# Patient Record
Sex: MALE | Race: WHITE | NOT HISPANIC OR LATINO | Employment: OTHER | URBAN - METROPOLITAN AREA
[De-identification: names, ages, dates, MRNs, and addresses within clinical notes are randomized per-mention and may not be internally consistent; named-entity substitution may affect disease eponyms.]

---

## 2017-02-22 RX ORDER — DIAZEPAM 5 MG/1
TABLET ORAL
Refills: 0 | OUTPATIENT
Start: 2017-02-22

## 2017-03-01 ENCOUNTER — OFFICE VISIT (OUTPATIENT)
Dept: MEDICAL GROUP | Facility: LAB | Age: 72
End: 2017-03-01
Payer: MEDICARE

## 2017-03-01 VITALS
DIASTOLIC BLOOD PRESSURE: 78 MMHG | BODY MASS INDEX: 31.07 KG/M2 | RESPIRATION RATE: 16 BRPM | TEMPERATURE: 99.2 F | WEIGHT: 197.97 LBS | HEART RATE: 81 BPM | SYSTOLIC BLOOD PRESSURE: 122 MMHG | HEIGHT: 67 IN | OXYGEN SATURATION: 96 %

## 2017-03-01 DIAGNOSIS — F41.9 ANXIETY: ICD-10-CM

## 2017-03-01 PROCEDURE — 1036F TOBACCO NON-USER: CPT | Performed by: NURSE PRACTITIONER

## 2017-03-01 PROCEDURE — G8419 CALC BMI OUT NRM PARAM NOF/U: HCPCS | Performed by: NURSE PRACTITIONER

## 2017-03-01 PROCEDURE — G8484 FLU IMMUNIZE NO ADMIN: HCPCS | Performed by: NURSE PRACTITIONER

## 2017-03-01 PROCEDURE — G8598 ASA/ANTIPLAT THER USED: HCPCS | Performed by: NURSE PRACTITIONER

## 2017-03-01 PROCEDURE — G8432 DEP SCR NOT DOC, RNG: HCPCS | Performed by: NURSE PRACTITIONER

## 2017-03-01 PROCEDURE — 99213 OFFICE O/P EST LOW 20 MIN: CPT | Performed by: NURSE PRACTITIONER

## 2017-03-01 PROCEDURE — 4040F PNEUMOC VAC/ADMIN/RCVD: CPT | Mod: 8P | Performed by: NURSE PRACTITIONER

## 2017-03-01 PROCEDURE — 1101F PT FALLS ASSESS-DOCD LE1/YR: CPT | Performed by: NURSE PRACTITIONER

## 2017-03-01 PROCEDURE — 3017F COLORECTAL CA SCREEN DOC REV: CPT | Performed by: NURSE PRACTITIONER

## 2017-03-01 RX ORDER — DIAZEPAM 5 MG/1
5 TABLET ORAL EVERY 8 HOURS PRN
Qty: 30 TAB | Refills: 1 | Status: SHIPPED
Start: 2017-03-01 | End: 2017-07-10 | Stop reason: SDUPTHER

## 2017-03-01 ASSESSMENT — PATIENT HEALTH QUESTIONNAIRE - PHQ9: CLINICAL INTERPRETATION OF PHQ2 SCORE: 0

## 2017-03-01 NOTE — MR AVS SNAPSHOT
"        Amari Esparza   3/1/2017 2:40 PM   Office Visit   MRN: [id number]    Department:  [facility] [place]   Dept Phone:  [phone]    Description:  Male : 1945   Provider:  HAROON Flowers           Reason for Visit     Medication Refill           Allergies as of 3/1/2017     Allergen Noted Reactions    Sulfa Drugs 2009   Swelling      You were diagnosed with     Anxiety   [258693]         Vital Signs     Blood Pressure Pulse Temperature Respirations Height Weight    122/78 mmHg 81 37.3 °C (99.2 °F) 16 1.702 m (5' 7\") 89.8 kg (197 lb 15.6 oz)    Body Mass Index Oxygen Saturation Smoking Status             31.00 kg/m2 96% Never Smoker          Basic Information     Date Of Birth Sex Race Ethnicity Preferred Language    1945 Male White Non- English      Your appointments     2017  8:00 AM   NEW TO YOU with Beatriz Damian M.D.   [facility] (--)    [address] [id number] [phone]              Problem List              ICD-10-CM Priority Class Noted - Resolved    Sinus bradycardia R00.1   Unknown - Present    ASTHMA    Unknown - Present    ED (erectile dysfunction) N52.9   2010 - Present    Anxiety F41.9   2010 - Present    History of ETOH abuse Z87.898   2012 - Present    Chronic gout of multiple sites M1A.09X0   2012 - Present    Sinusitis, chronic J32.9   2012 - Present    PTSD (post-traumatic stress disorder) F43.10   2012 - Present    Diverticulosis of colon K57.30   2012 - Present    Benign essential hypertension (Chronic) I10 Medium  2011 - Present    Carotid artery plaque (Chronic) I65.29 Low  2011 - Present    History of echocardiogram (Chronic) Z92.89   2010 - Present    HLD (hyperlipidemia) (Chronic) E78.5 Medium  2011 - Present    History of myocardial perfusion scan (Chronic) Z92.89   2010 - Present    Presence of permanent cardiac " pacemaker (Chronic) Z95.0 High  11/8/2011 - Present    Third degree AV block (CMS-HCC) (Chronic) I44.2 High  11/8/2011 - Present    GERD (gastroesophageal reflux disease) K21.9   3/25/2014 - Present    Arthritis M19.90   3/25/2014 - Present    Vitamin D deficiency disease E55.9   9/9/2015 - Present    Dry eyes H04.123   9/9/2015 - Present    Chronic pansinusitis J32.4   9/9/2015 - Present      Health Maintenance        Date Due Completion Dates    IMM PNEUMOCOCCAL 65+ (ADULT) LOW/MEDIUM RISK SERIES (1 of 2 - PCV13) 10/23/2011 ---    IMM INFLUENZA (1) 9/1/2016 ---    IMM DTaP/Tdap/Td Vaccine (2 - Td) 1/11/2022 1/11/2012    COLONOSCOPY 3/6/2025 3/6/2015 (Done), 10/22/2007 (Prv Comp)    [facility]    [facility] on 10/22/2007: Previously completed            Current Immunizations     SHINGLES VACCINE 1/11/2012    Tdap Vaccine 1/11/2012      Below and/or attached are the medications your provider expects you to take. Review all of your home medications and newly ordered medications with your provider and/or pharmacist. Follow medication instructions as directed by your provider and/or pharmacist. Please keep your medication list with you and share with your provider. Update the information when medications are discontinued, doses are changed, or new medications (including over-the-counter products) are added; and carry medication information at all times in the event of emergency situations     Allergies:  SULFA DRUGS - Swelling               Medications  Valid as of: March 01, 2017 -  4:35 PM    Generic Name Brand Name Tablet Size Instructions for use    Albuterol Sulfate (Aero Soln) albuterol 108 (90 BASE) MCG/ACT Inhale 2 Puffs by mouth every 6 hours as needed for Shortness of Breath.        Aspirin (Tab) aspirin 81 MG Take 81 mg by mouth every day. 2 times daily         Cholecalciferol (Cap) VITAMIN D 400 UNIT Take 3 Caps by mouth every day.        DiazePAM (Tab) VALIUM 5 MG Take 1 Tab by mouth  every 8 hours as needed for Anxiety.        HydroCHLOROthiazide (Tab) HYDRODIURIL 25 MG Take 25 mg by mouth as needed. FOR FLUID RETENTION.         Hypromellose (Solution) TEARS 0.4 % Place 1 Drop in both eyes 3 times a day as needed for Dry Eyes.        Indomethacin (Cap CR) INDOCIN SR 75 MG Take 1 Cap by mouth 2 times daily with meals as needed (gout symptoms).        Ketotifen Fumarate (Solution) ZADITOR 0.025 % Place 1 Drop in both eyes 2 times a day.        Losartan Potassium (Tab) COZAAR 50 MG Take 50 mg by mouth every day.        Methocarbamol (Tab) ROBAXIN 500 MG Take 2 Tabs by mouth 3 times a day as needed.        Naltrexone HCl (Tab) DEPADE 50 MG Take 50 mg by mouth every day.        Pantoprazole Sodium (Tablet Delayed Response) PROTONIX 40 MG Take 40 mg by mouth every day.        Sildenafil Citrate (Tab) VIAGRA 100 MG Take 1 Tab by mouth as needed for Erectile Dysfunction.        .                 Medicines prescribed today were sent to:     [facility]/PHARMACY #[id number] - [facility] [address]    [address] [address]    Phone: [phone] Fax: [phone]    Open 24 Hours?: No      Medication refill instructions:       If your prescription bottle indicates you have medication refills left, it is not necessary to call your provider’s office. Please contact your pharmacy and they will refill your medication.    If your prescription bottle indicates you do not have any refills left, you may request refills at any time through one of the following ways: The online [employer] system (except Urgent Care), by calling your provider’s office, or by asking your pharmacy to contact your provider’s office with a refill request. Medication refills are processed only during regular business hours and may not be available until the next business day. Your provider may request additional information or to have a follow-up visit with you prior to refilling your medication.   *Please Note: Medication refills are  assigned a new Rx number when refilled electronically. Your pharmacy may indicate that no refills were authorized even though a new prescription for the same medication is available at the pharmacy. Please request the medicine by name with the pharmacy before contacting your provider for a refill.           MyChart Status: Patient Declined

## 2017-03-02 NOTE — PROGRESS NOTES
"Chief Complaint   Patient presents with   • Medication Refill       HPI  Amari is a 71-year-old established male here with request to refill his diazepam. Typically #20-30 diazepam 5 mg tablets last him for 4-6 months. He takes diazepam only as needed for acute anxiety. He states that he's used diazepam as needed for anxiety from his 30 years. Denies any negative side effects of diazepam. He does not drink or drive a car within 4-6 hours of taking diazepam. He does not experience anxiety on a daily basis.      Past medical, surgical, family, and social history is reviewed and updated in Epic chart by me today.   Medications and allergies reviewed and updated in Epic chart by me today.     ROS:   As documented in history of present illness above    Exam:  Blood pressure 122/78, pulse 81, temperature 37.3 °C (99.2 °F), resp. rate 16, height 1.702 m (5' 7\"), weight 89.8 kg (197 lb 15.6 oz), SpO2 96 %.  Gen. appears healthy in no distress   Skin appropriate for sex and age   HEENT unremarkable   Neck no adenopathy, no nodules or masses palpable   Lungs clear   Heart regular   Extremities no edema   Neuro gait and station normal   Psych appropriate, calm and interactive      A/P:  1. Anxiety  -Stable and chronic issue. Continue same. Reviewed  profile which is appropriate, showing last pill was several months ago. Urine drug screen not collected today as patient does not take this medication on a daily basis.  - diazepam (VALIUM) 5 MG Tab; Take 1 Tab by mouth every 8 hours as needed for Anxiety.  Dispense: 30 Tab; Refill: 1      "

## 2017-04-13 ENCOUNTER — OFFICE VISIT (OUTPATIENT)
Dept: MEDICAL GROUP | Facility: LAB | Age: 72
End: 2017-04-13
Payer: MEDICARE

## 2017-04-13 VITALS
HEART RATE: 84 BPM | HEIGHT: 67 IN | OXYGEN SATURATION: 96 % | SYSTOLIC BLOOD PRESSURE: 136 MMHG | RESPIRATION RATE: 12 BRPM | BODY MASS INDEX: 30.76 KG/M2 | TEMPERATURE: 98.6 F | WEIGHT: 196 LBS | DIASTOLIC BLOOD PRESSURE: 82 MMHG

## 2017-04-13 DIAGNOSIS — F41.9 ANXIETY: ICD-10-CM

## 2017-04-13 DIAGNOSIS — F43.10 PTSD (POST-TRAUMATIC STRESS DISORDER): ICD-10-CM

## 2017-04-13 DIAGNOSIS — I10 BENIGN ESSENTIAL HYPERTENSION: Chronic | ICD-10-CM

## 2017-04-13 DIAGNOSIS — Z95.0 PRESENCE OF PERMANENT CARDIAC PACEMAKER: Chronic | ICD-10-CM

## 2017-04-13 DIAGNOSIS — J32.9 CHRONIC SINUSITIS, UNSPECIFIED LOCATION: ICD-10-CM

## 2017-04-13 DIAGNOSIS — R73.03 PREDIABETES: ICD-10-CM

## 2017-04-13 PROCEDURE — G8598 ASA/ANTIPLAT THER USED: HCPCS | Performed by: FAMILY MEDICINE

## 2017-04-13 PROCEDURE — 1101F PT FALLS ASSESS-DOCD LE1/YR: CPT | Performed by: FAMILY MEDICINE

## 2017-04-13 PROCEDURE — 4040F PNEUMOC VAC/ADMIN/RCVD: CPT | Mod: 8P | Performed by: FAMILY MEDICINE

## 2017-04-13 PROCEDURE — 99214 OFFICE O/P EST MOD 30 MIN: CPT | Performed by: FAMILY MEDICINE

## 2017-04-13 PROCEDURE — G8419 CALC BMI OUT NRM PARAM NOF/U: HCPCS | Performed by: FAMILY MEDICINE

## 2017-04-13 PROCEDURE — G8432 DEP SCR NOT DOC, RNG: HCPCS | Performed by: FAMILY MEDICINE

## 2017-04-13 PROCEDURE — 1036F TOBACCO NON-USER: CPT | Performed by: FAMILY MEDICINE

## 2017-04-13 RX ORDER — CLINDAMYCIN HYDROCHLORIDE 150 MG/1
300 CAPSULE ORAL 3 TIMES DAILY
COMMUNITY

## 2017-04-13 ASSESSMENT — ENCOUNTER SYMPTOMS
DOUBLE VISION: 0
NAUSEA: 0
INSOMNIA: 1
HEARTBURN: 0
DIZZINESS: 0
FEVER: 0
VOMITING: 0
SHORTNESS OF BREATH: 0

## 2017-04-13 NOTE — PROGRESS NOTES
"Subjective:      Amari Esparza is a 71 y.o. male who presents with Establish Care    Chief Complaint   Patient presents with   • Establish Care             HPI     Patient is here to establish. [facility], psychologist, primary care physician, psychiatrist, ENT.    Has a lump on his back and was told that it is fat. Had US to confirm this. [facility]. Is sometimes painful. He would like my opinion about this today.    Has bloodwork to go over today     Patient has a history of PTSD, anxiety. 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 States that both his [facility] and [facility] recommended that he have a service dog. He tells me today that the [facility] will not write a letter in support of this. He states that they are meeting next Wednesday to see if they can change their policy. He would like to know if I would write a letter of support for him if needed. Patient has had a dog in the past, in the 1980s. He states that he is able to sleep through the night and his anxiety is improved when he has a canine . When he does not have a canine  he sleeps 2 hours at a time. He also has more anxiety when he does not have a dog around. His current girlfriend has a dog and sometimes he will hang out with her dog and he does much better. He states that his PTSD mostly affects his sleep more than anything. He also states that he enjoys hiking however he has severe anxiety with hiking. He states that when he goes hiking he will \"freak out\". He reports that when he has a dog with him he is able to do this without being on high alert.  He currently is living somewhere where he cannot have a pet.    Patient has a pacemaker. He states that this is his second pacemaker.     Patient has chronic sinusitis. He will take clindamycin as needed      History of alcohol abuse. Was taking naltrexone and is off this " currently. Has not had etoh x 1 week. Was in rehab for a month in 2010 in [place] and this was helpful. [facility]    New patient health questionnaire reviewed and scanned into media    Patient Active Problem List    Diagnosis Date Noted   • Presence of permanent cardiac pacemaker 11/08/2011     Priority: High   • Third degree AV block (CMS-HCC) 11/08/2011     Priority: High   • Benign essential hypertension  BP is good. A little high today  11/08/2011     Priority: Medium   • HLD (hyperlipidemia)  Not on statin. Was on medication in the past  11/08/2011     Priority: Medium   • Carotid artery plaque 11/08/2011     Priority: Low   • Vitamin D deficiency disease 09/09/2015   • Dry eyes 09/09/2015   • Chronic pansinusitis 09/09/2015   • GERD (gastroesophageal reflux disease) 03/25/2014   • Arthritis 03/25/2014   • History of ETOH abuse 01/11/2012   • Chronic gout of multiple sites 01/11/2012   • Sinusitis, chronic 01/11/2012   • PTSD (post-traumatic stress disorder) 01/11/2012   • Diverticulosis of colon 01/11/2012   • ED (erectile dysfunction) 12/01/2010   • Anxiety  Taking valium maybe one every other day. Will take it during the day or at night. [facility] but [facility] not [facility]  12/01/2010   • History of echocardiogram 11/05/2010   • History of myocardial perfusion scan 11/05/2010   • Sinus bradycardia    • ASTHMA        Family History   Problem Relation Age of Onset   • Heart Disease Father    • Diabetes Father    • Diabetes Brother    • Cancer Sister      breast        Social History     Social History   • Marital Status: Single     Spouse Name: N/A   • Number of Children: N/A   • Years of Education: N/A     Occupational History   • Not on file.     Social History Main Topics   • Smoking status: Never Smoker    • Smokeless tobacco: Never Used   • Alcohol Use: No      Comment: quit    • Drug Use: No   • Sexual Activity:     Partners: Female     Other Topics  Concern   • Not on file     Social History Narrative         Current outpatient prescriptions:   •  clindamycin (CLEOCIN) 150 MG Cap, Take 300 mg by mouth 3 times a day., Disp: , Rfl:   •  diazepam (VALIUM) 5 MG Tab, Take 1 Tab by mouth every 8 hours as needed for Anxiety., Disp: 30 Tab, Rfl: 1  •  sildenafil citrate (VIAGRA) 100 MG tablet, Take 1 Tab by mouth as needed for Erectile Dysfunction., Disp: 9 Tab, Rfl: 11  •  indomethacin SR (INDOCIN SR) 75 MG Cap CR, Take 1 Cap by mouth 2 times daily with meals as needed (gout symptoms)., Disp: 60 Cap, Rfl: 1  •  cholecalciferol (VITAMIN D) 400 UNIT Cap, Take 3 Caps by mouth every day., Disp: 30 Cap, Rfl: 0  •  artificial tear (TEARS) 0.4 % ophthalmic solution, Place 1 Drop in both eyes 3 times a day as needed for Dry Eyes., Disp: 1 Bottle, Rfl: 5  •  ketotifen (ZADITOR) 0.025 % ophthalmic solution, Place 1 Drop in both eyes 2 times a day., Disp: 10 mL, Rfl: 3  •  methocarbamol (ROBAXIN) 500 MG Tab, Take 2 Tabs by mouth 3 times a day as needed., Disp: 90 Tab, Rfl: 2  •  hydrochlorothiazide (HYDRODIURIL) 25 MG TABS, Take 25 mg by mouth as needed. FOR FLUID RETENTION. , Disp: , Rfl:   •  losartan (COZAAR) 50 MG TABS, Take 50 mg by mouth every day., Disp: , Rfl:   •  albuterol (VENTOLIN OR PROVENTIL) 108 (90 BASE) MCG/ACT AERS, Inhale 2 Puffs by mouth every 6 hours as needed for Shortness of Breath., Disp: 2 Inhaler, Rfl: 11  •  ASPIRIN 81 MG tablet, Take 81 mg by mouth every day. 2 times daily , Disp: , Rfl:   •  pantoprazole (PROTONIX) 40 MG TBEC, Take 40 mg by mouth every day., Disp: , Rfl:         Review of Systems   Constitutional: Negative for fever.   HENT:        Congestion. Getting better. Chronic sinus problems    Eyes: Negative for double vision.   Respiratory: Negative for shortness of breath.    Cardiovascular: Negative for chest pain.   Gastrointestinal: Negative for heartburn, nausea and vomiting.        No change in stool   [facility]  "  Genitourinary: Negative for frequency.   Neurological: Negative for dizziness.   Endo/Heme/Allergies: Positive for environmental allergies (will see allergist this month ).   Psychiatric/Behavioral: The patient has insomnia (ambien not helping ).           Objective:     /82 mmHg  Pulse 84  Temp(Src) 37 °C (98.6 °F)  Resp 12  Ht 1.702 m (5' 7.01\")  Wt 88.905 kg (196 lb)  BMI 30.69 kg/m2  SpO2 96%     Physical Exam   Constitutional: He appears well-developed and well-nourished. He is active and cooperative.  Non-toxic appearance. He does not have a sickly appearance.   HENT:   Head: Normocephalic and atraumatic.   Eyes: Conjunctivae and EOM are normal.   Cardiovascular: Normal rate, regular rhythm and normal heart sounds.    Pulmonary/Chest: Effort normal and breath sounds normal. No tachypnea. No respiratory distress. He has no decreased breath sounds. He has no wheezes. He has no rhonchi. He has no rales.   Abdominal: Soft. There is no tenderness. There is no rigidity, no rebound and no guarding.   Neurological: He is alert. He displays no tremor. He displays no seizure activity. Coordination and gait normal.   Skin: Skin is warm and dry. He is not diaphoretic.   Lipoma on the mid right back by scapula    Psychiatric: He has a normal mood and affect. His speech is normal and behavior is normal.     [facility]. His CMP is essentially within normal limits. His total cholesterol 159, triglyceride 42, HDL 54 and LDL 98. His A1c is 5.9. These labs were done on January 18, 2017          Assessment/Plan:       1. Benign essential hypertension  Controlled. Continue medication    2. Chronic sinusitis, unspecified location  Patient is on antibiotics. [facility] is [facility]    3. PTSD (post-traumatic stress disorder)  Patient is interested in having a service dog. I agree with this. He is living somewhere where he cannot have pets. He states that he will try to get a letter of support " from the [facility] however if he has any trouble he will let me know. He's had a dog in the past and this greatly improved his anxiety and his sleep    4. Anxiety  Continue Valium as needed. He is to continue with his psychiatry and [facility]    5. Presence of permanent cardiac pacemaker  [facility]    Prediabetes. Recommend patient watch his carbohydrate intake.     He does want a second opinion about his lipoma excision. I am in agreement to have this excised.    Patient to follow up annually, sooner when necessary

## 2017-04-13 NOTE — MR AVS SNAPSHOT
"        Amari Esparza   2017 8:00 AM   Office Visit   MRN: [id number]    Department:  [facility]   Dept Phone:  [phone]    Description:  Male : 1945   Provider:  Beatriz Damian M.D.           Reason for Visit     Establish Care           Allergies as of 2017     Allergen Noted Reactions    Sulfa Drugs 2009   Swelling      You were diagnosed with     Benign essential hypertension   [003773]       Chronic sinusitis, unspecified location   [4319653]       PTSD (post-traumatic stress disorder)   [251590]       Anxiety   [551764]       Presence of permanent cardiac pacemaker   [775391]       Prediabetes   [126323]         Vital Signs     Blood Pressure Pulse Temperature Respirations Height Weight    136/82 mmHg 84 37 °C (98.6 °F) 12 1.702 m (5' 7.01\") 88.905 kg (196 lb)    Body Mass Index Oxygen Saturation Smoking Status             30.69 kg/m2 96% Never Smoker          Basic Information     Date Of Birth Sex Race Ethnicity Preferred Language    1945 Male White Non- English      Problem List              ICD-10-CM Priority Class Noted - Resolved    Sinus bradycardia R00.1   Unknown - Present    ASTHMA    Unknown - Present    ED (erectile dysfunction) N52.9   2010 - Present    Anxiety F41.9   2010 - Present    History of ETOH abuse Z87.898   2012 - Present    Chronic gout of multiple sites M1A.09X0   2012 - Present    Sinusitis, chronic J32.9   2012 - Present    PTSD (post-traumatic stress disorder) F43.10   2012 - Present    Diverticulosis of colon K57.30   2012 - Present    Benign essential hypertension (Chronic) I10 Medium  2011 - Present    Carotid artery plaque (Chronic) I65.29 Low  2011 - Present    History of echocardiogram (Chronic) Z92.89   2010 - Present    HLD (hyperlipidemia) (Chronic) E78.5 Medium  2011 - Present    History of myocardial perfusion scan (Chronic) Z92.89   2010 - Present   " Presence of permanent cardiac pacemaker (Chronic) Z95.0 High  11/8/2011 - Present    Third degree AV block (CMS-HCC) (Chronic) I44.2 High  11/8/2011 - Present    GERD (gastroesophageal reflux disease) K21.9   3/25/2014 - Present    Arthritis M19.90   3/25/2014 - Present    Vitamin D deficiency disease E55.9   9/9/2015 - Present    Dry eyes H04.123   9/9/2015 - Present    Chronic pansinusitis J32.4   9/9/2015 - Present    Prediabetes R73.03   4/13/2017 - Present      Health Maintenance        Date Due Completion Dates    IMM PNEUMOCOCCAL 65+ (ADULT) LOW/MEDIUM RISK SERIES (1 of 2 - PCV13) 10/23/2011 ---    IMM DTaP/Tdap/Td Vaccine (2 - Td) 1/11/2022 1/11/2012    COLONOSCOPY 3/6/2025 3/6/2015 (Done), 10/22/2007 (Prv Comp)    [facility]    Override on 10/22/2007: Previously completed            Current Immunizations     SHINGLES VACCINE 1/11/2012    Tdap Vaccine 1/11/2012      Below and/or attached are the medications your provider expects you to take. Review all of your home medications and newly ordered medications with your provider and/or pharmacist. Follow medication instructions as directed by your provider and/or pharmacist. Please keep your medication list with you and share with your provider. Update the information when medications are discontinued, doses are changed, or new medications (including over-the-counter products) are added; and carry medication information at all times in the event of emergency situations     Allergies:  SULFA DRUGS - Swelling               Medications  Valid as of: April 13, 2017 -  8:42 AM    Generic Name Brand Name Tablet Size Instructions for use    Albuterol Sulfate (Aero Soln) albuterol 108 (90 BASE) MCG/ACT Inhale 2 Puffs by mouth every 6 hours as needed for Shortness of Breath.        Aspirin (Tab) aspirin 81 MG Take 81 mg by mouth every day. 2 times daily         Cholecalciferol (Cap) VITAMIN D 400 UNIT Take 3 Caps by mouth every day.         Clindamycin HCl (Cap) CLEOCIN 150 MG Take 300 mg by mouth 3 times a day.        DiazePAM (Tab) VALIUM 5 MG Take 1 Tab by mouth every 8 hours as needed for Anxiety.        HydroCHLOROthiazide (Tab) HYDRODIURIL 25 MG Take 25 mg by mouth as needed. FOR FLUID RETENTION.         Hypromellose (Solution) TEARS 0.4 % Place 1 Drop in both eyes 3 times a day as needed for Dry Eyes.        Indomethacin (Cap CR) INDOCIN SR 75 MG Take 1 Cap by mouth 2 times daily with meals as needed (gout symptoms).        Ketotifen Fumarate (Solution) ZADITOR 0.025 % Place 1 Drop in both eyes 2 times a day.        Losartan Potassium (Tab) COZAAR 50 MG Take 50 mg by mouth every day.        Methocarbamol (Tab) ROBAXIN 500 MG Take 2 Tabs by mouth 3 times a day as needed.        Pantoprazole Sodium (Tablet Delayed Response) PROTONIX 40 MG Take 40 mg by mouth every day.        Sildenafil Citrate (Tab) VIAGRA 100 MG Take 1 Tab by mouth as needed for Erectile Dysfunction.        .                 Medicines prescribed today were sent to:     [facility]/PHARMACY #[id number] [place], [place] - [address]    [address] [place] [address]    Phone: [phone] Fax: [phone]    Open 24 Hours?: No      Medication refill instructions:       If your prescription bottle indicates you have medication refills left, it is not necessary to call your provider’s office. Please contact your pharmacy and they will refill your medication.    If your prescription bottle indicates you do not have any refills left, you may request refills at any time through one of the following ways: The online [employer] system (except Urgent Care), by calling your provider’s office, or by asking your pharmacy to contact your provider’s office with a refill request. Medication refills are processed only during regular business hours and may not be available until the next business day. Your provider may request additional information or to have a follow-up visit with you prior to refilling  your medication.   *Please Note: Medication refills are assigned a new Rx number when refilled electronically. Your pharmacy may indicate that no refills were authorized even though a new prescription for the same medication is available at the pharmacy. Please request the medicine by name with the pharmacy before contacting your provider for a refill.           MyChart Status: Patient Declined

## 2017-04-16 ENCOUNTER — PATIENT MESSAGE (OUTPATIENT)
Dept: MEDICAL GROUP | Facility: LAB | Age: 72
End: 2017-04-16

## 2017-05-03 ENCOUNTER — TELEPHONE (OUTPATIENT)
Dept: MEDICAL GROUP | Facility: LAB | Age: 72
End: 2017-05-03

## 2017-05-03 NOTE — TELEPHONE ENCOUNTER
Patients  asking about the letter Dr. Damian wrote about a canine .  He has had his girlfriends dog living with him for months before Dr. Damian wrote the letter and patient's apartments told him he had to get rid of it right before he asked Dr. Damian for the letter.  The manager would like to take the patient to court regarding the dog and is asking if Dr. Damian will back her letter in court, and would like to speak to Dr. Damian. Her phone number is below.    Trev  cell  [phone]

## 2018-02-14 NOTE — TELEPHONE ENCOUNTER
Was the patient seen in the last year in this department? Yes     Does patient have an active prescription for medications requested? No     Received Request Via: Pharmacy     Per  last fill: 11-7-17 # 30

## 2018-02-15 RX ORDER — DIAZEPAM 5 MG/1
TABLET ORAL
Qty: 30 TAB | Refills: 1
Start: 2018-02-15 | End: 2018-03-17

## 2021-01-15 DIAGNOSIS — Z23 NEED FOR VACCINATION: ICD-10-CM

## 2024-08-27 ENCOUNTER — APPOINTMENT (OUTPATIENT)
Dept: RADIOLOGY | Facility: MEDICAL CENTER | Age: 79
End: 2024-08-27
Attending: EMERGENCY MEDICINE
Payer: MEDICARE

## 2024-08-27 ENCOUNTER — HOSPITAL ENCOUNTER (INPATIENT)
Facility: MEDICAL CENTER | Age: 79
LOS: 1 days | End: 2024-08-28
Attending: EMERGENCY MEDICINE | Admitting: HOSPITALIST
Payer: MEDICARE

## 2024-08-27 DIAGNOSIS — R55 SYNCOPE, UNSPECIFIED SYNCOPE TYPE: ICD-10-CM

## 2024-08-27 DIAGNOSIS — T82.110A FAILURE OF PACEMAKER LEAD, INITIAL ENCOUNTER: ICD-10-CM

## 2024-08-27 PROBLEM — F10.90 ALCOHOL USE DISORDER: Status: ACTIVE | Noted: 2024-08-27

## 2024-08-27 PROBLEM — D72.829 LEUKOCYTOSIS: Status: ACTIVE | Noted: 2024-08-27

## 2024-08-27 PROBLEM — E83.52 HYPERCALCEMIA: Status: ACTIVE | Noted: 2024-08-27

## 2024-08-27 LAB
ALBUMIN SERPL BCP-MCNC: 4 G/DL (ref 3.2–4.9)
ALBUMIN/GLOB SERPL: 1.5 G/DL
ALP SERPL-CCNC: 65 U/L (ref 30–99)
ALT SERPL-CCNC: 40 U/L (ref 2–50)
ANION GAP SERPL CALC-SCNC: 10 MMOL/L (ref 7–16)
AST SERPL-CCNC: 41 U/L (ref 12–45)
BASOPHILS # BLD AUTO: 0.9 % (ref 0–1.8)
BASOPHILS # BLD: 0.1 K/UL (ref 0–0.12)
BILIRUB SERPL-MCNC: 0.8 MG/DL (ref 0.1–1.5)
BUN SERPL-MCNC: 12 MG/DL (ref 8–22)
CALCIUM ALBUM COR SERPL-MCNC: 11.5 MG/DL (ref 8.5–10.5)
CALCIUM SERPL-MCNC: 11.5 MG/DL (ref 8.5–10.5)
CHLORIDE SERPL-SCNC: 102 MMOL/L (ref 96–112)
CO2 SERPL-SCNC: 26 MMOL/L (ref 20–33)
CREAT SERPL-MCNC: 0.84 MG/DL (ref 0.5–1.4)
EKG IMPRESSION: NORMAL
EOSINOPHIL # BLD AUTO: 0.09 K/UL (ref 0–0.51)
EOSINOPHIL NFR BLD: 0.8 % (ref 0–6.9)
ERYTHROCYTE [DISTWIDTH] IN BLOOD BY AUTOMATED COUNT: 41.4 FL (ref 35.9–50)
GFR SERPLBLD CREATININE-BSD FMLA CKD-EPI: 89 ML/MIN/1.73 M 2
GLOBULIN SER CALC-MCNC: 2.6 G/DL (ref 1.9–3.5)
GLUCOSE SERPL-MCNC: 105 MG/DL (ref 65–99)
HCT VFR BLD AUTO: 47.1 % (ref 42–52)
HGB BLD-MCNC: 16.3 G/DL (ref 14–18)
IMM GRANULOCYTES # BLD AUTO: 0.05 K/UL (ref 0–0.11)
IMM GRANULOCYTES NFR BLD AUTO: 0.4 % (ref 0–0.9)
LYMPHOCYTES # BLD AUTO: 2.06 K/UL (ref 1–4.8)
LYMPHOCYTES NFR BLD: 18 % (ref 22–41)
MCH RBC QN AUTO: 33 PG (ref 27–33)
MCHC RBC AUTO-ENTMCNC: 34.6 G/DL (ref 32.3–36.5)
MCV RBC AUTO: 95.3 FL (ref 81.4–97.8)
MONOCYTES # BLD AUTO: 1.55 K/UL (ref 0–0.85)
MONOCYTES NFR BLD AUTO: 13.6 % (ref 0–13.4)
NEUTROPHILS # BLD AUTO: 7.57 K/UL (ref 1.82–7.42)
NEUTROPHILS NFR BLD: 66.3 % (ref 44–72)
NRBC # BLD AUTO: 0 K/UL
NRBC BLD-RTO: 0 /100 WBC (ref 0–0.2)
PLATELET # BLD AUTO: 282 K/UL (ref 164–446)
PMV BLD AUTO: 10.3 FL (ref 9–12.9)
POTASSIUM SERPL-SCNC: 3.8 MMOL/L (ref 3.6–5.5)
PROT SERPL-MCNC: 6.6 G/DL (ref 6–8.2)
RBC # BLD AUTO: 4.94 M/UL (ref 4.7–6.1)
SODIUM SERPL-SCNC: 138 MMOL/L (ref 135–145)
TROPONIN T SERPL-MCNC: 9 NG/L (ref 6–19)
WBC # BLD AUTO: 11.4 K/UL (ref 4.8–10.8)

## 2024-08-27 PROCEDURE — 80053 COMPREHEN METABOLIC PANEL: CPT

## 2024-08-27 PROCEDURE — 770020 HCHG ROOM/CARE - TELE (206)

## 2024-08-27 PROCEDURE — 99255 IP/OBS CONSLTJ NEW/EST HI 80: CPT | Mod: 57 | Performed by: INTERNAL MEDICINE

## 2024-08-27 PROCEDURE — 85025 COMPLETE CBC W/AUTO DIFF WBC: CPT

## 2024-08-27 PROCEDURE — 84484 ASSAY OF TROPONIN QUANT: CPT

## 2024-08-27 PROCEDURE — 99223 1ST HOSP IP/OBS HIGH 75: CPT | Mod: 57 | Performed by: INTERNAL MEDICINE

## 2024-08-27 PROCEDURE — 93005 ELECTROCARDIOGRAM TRACING: CPT

## 2024-08-27 PROCEDURE — 99223 1ST HOSP IP/OBS HIGH 75: CPT | Performed by: HOSPITALIST

## 2024-08-27 PROCEDURE — 71045 X-RAY EXAM CHEST 1 VIEW: CPT

## 2024-08-27 PROCEDURE — 99285 EMERGENCY DEPT VISIT HI MDM: CPT

## 2024-08-27 PROCEDURE — 36415 COLL VENOUS BLD VENIPUNCTURE: CPT

## 2024-08-27 PROCEDURE — 700102 HCHG RX REV CODE 250 W/ 637 OVERRIDE(OP): Performed by: HOSPITALIST

## 2024-08-27 PROCEDURE — 93005 ELECTROCARDIOGRAM TRACING: CPT | Performed by: EMERGENCY MEDICINE

## 2024-08-27 PROCEDURE — A9270 NON-COVERED ITEM OR SERVICE: HCPCS | Performed by: HOSPITALIST

## 2024-08-27 RX ORDER — ONDANSETRON 4 MG/1
4 TABLET, ORALLY DISINTEGRATING ORAL EVERY 4 HOURS PRN
Status: DISCONTINUED | OUTPATIENT
Start: 2024-08-27 | End: 2024-08-28 | Stop reason: HOSPADM

## 2024-08-27 RX ORDER — LABETALOL HYDROCHLORIDE 5 MG/ML
10 INJECTION, SOLUTION INTRAVENOUS EVERY 4 HOURS PRN
Status: DISCONTINUED | OUTPATIENT
Start: 2024-08-27 | End: 2024-08-28 | Stop reason: HOSPADM

## 2024-08-27 RX ORDER — ACETAMINOPHEN 325 MG/1
650 TABLET ORAL EVERY 6 HOURS PRN
Status: DISCONTINUED | OUTPATIENT
Start: 2024-08-27 | End: 2024-08-28 | Stop reason: HOSPADM

## 2024-08-27 RX ORDER — FOLIC ACID 1 MG/1
1 TABLET ORAL DAILY
Status: DISCONTINUED | OUTPATIENT
Start: 2024-08-27 | End: 2024-08-28 | Stop reason: HOSPADM

## 2024-08-27 RX ORDER — CETIRIZINE HYDROCHLORIDE 10 MG/1
10 TABLET ORAL DAILY
COMMUNITY

## 2024-08-27 RX ORDER — TAMSULOSIN HYDROCHLORIDE 0.4 MG/1
0.4 CAPSULE ORAL DAILY
COMMUNITY

## 2024-08-27 RX ORDER — CARBOXYMETHYLCELLULOSE SODIUM 5 MG/ML
1 SOLUTION/ DROPS OPHTHALMIC PRN
Status: DISCONTINUED | OUTPATIENT
Start: 2024-08-27 | End: 2024-08-28 | Stop reason: HOSPADM

## 2024-08-27 RX ORDER — GAUZE BANDAGE 2" X 2"
100 BANDAGE TOPICAL DAILY
Status: DISCONTINUED | OUTPATIENT
Start: 2024-08-27 | End: 2024-08-28 | Stop reason: HOSPADM

## 2024-08-27 RX ORDER — INDOMETHACIN 25 MG/1
25 CAPSULE ORAL
COMMUNITY

## 2024-08-27 RX ORDER — DIAZEPAM 5 MG/1
5 TABLET ORAL EVERY 8 HOURS PRN
Status: DISCONTINUED | OUTPATIENT
Start: 2024-08-27 | End: 2024-08-27

## 2024-08-27 RX ORDER — VITAMIN B COMPLEX
1000 TABLET ORAL DAILY
Status: DISCONTINUED | OUTPATIENT
Start: 2024-08-27 | End: 2024-08-27

## 2024-08-27 RX ORDER — OXYCODONE HYDROCHLORIDE 5 MG/1
2.5 TABLET ORAL
Status: DISCONTINUED | OUTPATIENT
Start: 2024-08-27 | End: 2024-08-28 | Stop reason: HOSPADM

## 2024-08-27 RX ORDER — POLYETHYLENE GLYCOL 3350 17 G/17G
1 POWDER, FOR SOLUTION ORAL
Status: DISCONTINUED | OUTPATIENT
Start: 2024-08-27 | End: 2024-08-28 | Stop reason: HOSPADM

## 2024-08-27 RX ORDER — ATORVASTATIN CALCIUM 10 MG/1
10 TABLET, FILM COATED ORAL EVERY EVENING
Status: DISCONTINUED | OUTPATIENT
Start: 2024-08-27 | End: 2024-08-28 | Stop reason: HOSPADM

## 2024-08-27 RX ORDER — OXYCODONE HYDROCHLORIDE 5 MG/1
5 TABLET ORAL
Status: DISCONTINUED | OUTPATIENT
Start: 2024-08-27 | End: 2024-08-28 | Stop reason: HOSPADM

## 2024-08-27 RX ORDER — ALBUTEROL SULFATE 90 UG/1
2 INHALANT RESPIRATORY (INHALATION) EVERY 6 HOURS PRN
Status: DISCONTINUED | OUTPATIENT
Start: 2024-08-27 | End: 2024-08-28 | Stop reason: HOSPADM

## 2024-08-27 RX ORDER — AMOXICILLIN 250 MG
2 CAPSULE ORAL EVERY EVENING
Status: DISCONTINUED | OUTPATIENT
Start: 2024-08-27 | End: 2024-08-28 | Stop reason: HOSPADM

## 2024-08-27 RX ORDER — NALTREXONE HYDROCHLORIDE 50 MG/1
50 TABLET, FILM COATED ORAL DAILY
COMMUNITY

## 2024-08-27 RX ORDER — PANTOPRAZOLE SODIUM 40 MG/1
40 TABLET, DELAYED RELEASE ORAL DAILY
Status: DISCONTINUED | OUTPATIENT
Start: 2024-08-27 | End: 2024-08-27

## 2024-08-27 RX ORDER — MORPHINE SULFATE 4 MG/ML
2 INJECTION INTRAVENOUS
Status: DISCONTINUED | OUTPATIENT
Start: 2024-08-27 | End: 2024-08-28 | Stop reason: HOSPADM

## 2024-08-27 RX ORDER — ONDANSETRON 2 MG/ML
4 INJECTION INTRAMUSCULAR; INTRAVENOUS EVERY 4 HOURS PRN
Status: DISCONTINUED | OUTPATIENT
Start: 2024-08-27 | End: 2024-08-28 | Stop reason: HOSPADM

## 2024-08-27 RX ORDER — SODIUM CHLORIDE 9 MG/ML
INJECTION, SOLUTION INTRAVENOUS CONTINUOUS
Status: DISCONTINUED | OUTPATIENT
Start: 2024-08-27 | End: 2024-08-28 | Stop reason: HOSPADM

## 2024-08-27 RX ORDER — ASPIRIN 81 MG/1
81 TABLET, CHEWABLE ORAL DAILY
Status: DISCONTINUED | OUTPATIENT
Start: 2024-08-27 | End: 2024-08-28 | Stop reason: HOSPADM

## 2024-08-27 RX ORDER — LOSARTAN POTASSIUM 50 MG/1
50 TABLET ORAL DAILY
Status: DISCONTINUED | OUTPATIENT
Start: 2024-08-27 | End: 2024-08-28 | Stop reason: HOSPADM

## 2024-08-27 RX ADMIN — ASPIRIN 81 MG: 81 TABLET, CHEWABLE ORAL at 15:32

## 2024-08-27 RX ADMIN — ALBUTEROL SULFATE 2 PUFF: 90 AEROSOL, METERED RESPIRATORY (INHALATION) at 22:16

## 2024-08-27 RX ADMIN — ALBUTEROL SULFATE 2 PUFF: 90 AEROSOL, METERED RESPIRATORY (INHALATION) at 18:45

## 2024-08-27 RX ADMIN — LOSARTAN POTASSIUM 50 MG: 50 TABLET, FILM COATED ORAL at 15:32

## 2024-08-27 SDOH — ECONOMIC STABILITY: TRANSPORTATION INSECURITY
IN THE PAST 12 MONTHS, HAS THE LACK OF TRANSPORTATION KEPT YOU FROM MEDICAL APPOINTMENTS OR FROM GETTING MEDICATIONS?: NO

## 2024-08-27 SDOH — ECONOMIC STABILITY: TRANSPORTATION INSECURITY
IN THE PAST 12 MONTHS, HAS LACK OF RELIABLE TRANSPORTATION KEPT YOU FROM MEDICAL APPOINTMENTS, MEETINGS, WORK OR FROM GETTING THINGS NEEDED FOR DAILY LIVING?: NO

## 2024-08-27 ASSESSMENT — SOCIAL DETERMINANTS OF HEALTH (SDOH)
WITHIN THE LAST YEAR, HAVE YOU BEEN KICKED, HIT, SLAPPED, OR OTHERWISE PHYSICALLY HURT BY YOUR PARTNER OR EX-PARTNER?: NO
WITHIN THE LAST YEAR, HAVE TO BEEN RAPED OR FORCED TO HAVE ANY KIND OF SEXUAL ACTIVITY BY YOUR PARTNER OR EX-PARTNER?: NO
WITHIN THE PAST 12 MONTHS, YOU WORRIED THAT YOUR FOOD WOULD RUN OUT BEFORE YOU GOT THE MONEY TO BUY MORE: NEVER TRUE
WITHIN THE LAST YEAR, HAVE YOU BEEN AFRAID OF YOUR PARTNER OR EX-PARTNER?: NO
WITHIN THE LAST YEAR, HAVE YOU BEEN HUMILIATED OR EMOTIONALLY ABUSED IN OTHER WAYS BY YOUR PARTNER OR EX-PARTNER?: NO
IN THE PAST 12 MONTHS, HAS THE ELECTRIC, GAS, OIL, OR WATER COMPANY THREATENED TO SHUT OFF SERVICE IN YOUR HOME?: NO
WITHIN THE PAST 12 MONTHS, THE FOOD YOU BOUGHT JUST DIDN'T LAST AND YOU DIDN'T HAVE MONEY TO GET MORE: NEVER TRUE

## 2024-08-27 ASSESSMENT — LIFESTYLE VARIABLES
DOES PATIENT WANT TO STOP DRINKING: NO
HAVE PEOPLE ANNOYED YOU BY CRITICIZING YOUR DRINKING: NO
EVER HAD A DRINK FIRST THING IN THE MORNING TO STEADY YOUR NERVES TO GET RID OF A HANGOVER: NO
SUBSTANCE_ABUSE: 0
TOTAL SCORE: 0
HAVE YOU EVER FELT YOU SHOULD CUT DOWN ON YOUR DRINKING: NO
TOTAL SCORE: 0
EVER FELT BAD OR GUILTY ABOUT YOUR DRINKING: NO
ON A TYPICAL DAY WHEN YOU DRINK ALCOHOL HOW MANY DRINKS DO YOU HAVE: 4
TOTAL SCORE: 0
ALCOHOL_USE: YES
AVERAGE NUMBER OF DAYS PER WEEK YOU HAVE A DRINK CONTAINING ALCOHOL: 7
HOW MANY TIMES IN THE PAST YEAR HAVE YOU HAD 5 OR MORE DRINKS IN A DAY: 360
CONSUMPTION TOTAL: POSITIVE

## 2024-08-27 ASSESSMENT — PAIN DESCRIPTION - PAIN TYPE
TYPE: ACUTE PAIN
TYPE: ACUTE PAIN

## 2024-08-27 ASSESSMENT — PATIENT HEALTH QUESTIONNAIRE - PHQ9
SUM OF ALL RESPONSES TO PHQ9 QUESTIONS 1 AND 2: 0
1. LITTLE INTEREST OR PLEASURE IN DOING THINGS: NOT AT ALL
2. FEELING DOWN, DEPRESSED, IRRITABLE, OR HOPELESS: NOT AT ALL

## 2024-08-27 ASSESSMENT — ENCOUNTER SYMPTOMS
PND: 0
LOSS OF CONSCIOUSNESS: 1
EYE DISCHARGE: 0
CHILLS: 0
FLANK PAIN: 0
ORTHOPNEA: 0
DIAPHORESIS: 0
SENSORY CHANGE: 0
PSYCHIATRIC NEGATIVE: 1
GASTROINTESTINAL NEGATIVE: 1
SHORTNESS OF BREATH: 0
RESPIRATORY NEGATIVE: 1
FALLS: 0
STRIDOR: 0
BLOOD IN STOOL: 0
BRUISES/BLEEDS EASILY: 0
POLYDIPSIA: 0
MYALGIAS: 0
BACK PAIN: 0
PHOTOPHOBIA: 0
DEPRESSION: 0
SEIZURES: 0
FEVER: 0
SINUS PAIN: 0
FOCAL WEAKNESS: 0
COUGH: 0
WHEEZING: 0
CARDIOVASCULAR NEGATIVE: 1
INSOMNIA: 0
EYE REDNESS: 0
EYES NEGATIVE: 1
CONSTITUTIONAL NEGATIVE: 1
DOUBLE VISION: 0
DIZZINESS: 0
ABDOMINAL PAIN: 0
SPUTUM PRODUCTION: 0
HEARTBURN: 0
TINGLING: 0

## 2024-08-27 ASSESSMENT — COGNITIVE AND FUNCTIONAL STATUS - GENERAL
SUGGESTED CMS G CODE MODIFIER MOBILITY: CJ
SUGGESTED CMS G CODE MODIFIER DAILY ACTIVITY: CJ
MOBILITY SCORE: 22
CLIMB 3 TO 5 STEPS WITH RAILING: A LITTLE
DRESSING REGULAR LOWER BODY CLOTHING: A LITTLE
HELP NEEDED FOR BATHING: A LITTLE
DAILY ACTIVITIY SCORE: 22
WALKING IN HOSPITAL ROOM: A LITTLE

## 2024-08-27 ASSESSMENT — COPD QUESTIONNAIRES
HAVE YOU SMOKED AT LEAST 100 CIGARETTES IN YOUR ENTIRE LIFE: NO/DON'T KNOW
DURING THE PAST 4 WEEKS HOW MUCH DID YOU FEEL SHORT OF BREATH: NONE/LITTLE OF THE TIME
DO YOU EVER COUGH UP ANY MUCUS OR PHLEGM?: NO/ONLY WITH OCCASIONAL COLDS OR INFECTIONS
COPD SCREENING SCORE: 2

## 2024-08-27 ASSESSMENT — FIBROSIS 4 INDEX
FIB4 SCORE: 1.77
FIB4 SCORE: 1.77

## 2024-08-27 NOTE — ASSESSMENT & PLAN NOTE
Patient tells me he drinks 4 shots of tequila every day and he has been doing this for 60 years.  He has no interest in quitting, attempted to discuss cessation with him.

## 2024-08-27 NOTE — CARE PLAN
The patient is Stable - Low risk of patient condition declining or worsening         Progress made toward(s) clinical / shift goals:        Problem: Optimal Care for Alcohol Withdrawal  Goal: Optimal Care for the alcohol withdrawal patient  Outcome: Progressing     Problem: Seizure Precautions  Goal: Implementation of seizure precautions  Outcome: Progressing     Problem: Lifestyle Changes  Goal: Patient's ability to identify lifestyle changes and available resources to help reduce recurrence of condition will improve  Outcome: Progressing     Problem: Psychosocial  Goal: Patient's level of anxiety will decrease  Outcome: Progressing  Goal: Spiritual and cultural needs incorporated into hospitalization  Outcome: Progressing     Problem: Risk for Aspiration  Goal: Patient's risk for aspiration will be absent or decrease  Outcome: Progressing     Problem: Knowledge Deficit - Standard  Goal: Patient and family/care givers will demonstrate understanding of plan of care, disease process/condition, diagnostic tests and medications  Outcome: Progressing

## 2024-08-27 NOTE — CONSULTS
Cardiology Initial Consult Note    Reason for Consult:  Asked by Dr Geoff Brooks M.D. to see this patient with syncope, PPM device malfunction  Patient's PCP: Beatriz Damian M.D.    CC:   Chief Complaint   Patient presents with    Syncope     [facility], pt states he was walking to his cardiologist apt and had a syncopal event lasting 2 seconds, pt got up and went to his cardio apt and was told his pacemaker wire is fractured was told to go to ER and [facility], pt denies any chest pain, lightheaded or any symptoms        HPI:    This is a 78-year-old male with past medical history significant for paroxysmal atrial fibrillation not on OAC due to patient preference, history of complete heart block status post [place] PPM in 2008 with generator change in 2015 and 2022, RAMSEY who was [facility] due to concern for RV lead fracture.    [facility] (G[facility] Best) earlier today due to concern for device malfunction.  [facility] and there was concern for device noise and loss of capture.  Subsequently he was sent to [facility] [place] lead revision due to concern for lead fracture.    Of note, on the way to the cardiologist's office today, he sustained a syncopal event.  Had sudden loss of consciousness without prodromal symptoms.  Lasted for a few seconds before he regained consciousness.  Sustained minor traumatic injuries.    On evaluation this afternoon, he feels okay.  No major cardiac complaints.  Denies having chest pain or dyspnea.  No lightheadedness or dizziness.  EKG demonstrates sinus with underlying ventricular paced rhythm.    Medications / Drug list prior to admission:  No current facility-administered medications on file prior to encounter.     Current Outpatient Medications on File Prior to Encounter   Medication Sig Dispense Refill    indomethacin (INDOCIN) 25 MG Cap Take 25 mg by mouth 1 time a day as  needed. Indications: Acute Joint Inflammation in Gout      cetirizine (ZYRTEC) 10 MG Tab Take 10 mg by mouth every day.      naltrexone (DEPADE) 50 MG Tab Take 50 mg by mouth every day.      tamsulosin (FLOMAX) 0.4 MG capsule Take 0.4 mg by mouth every day.      artificial tear (TEARS) 0.4 % ophthalmic solution Place 1 Drop in both eyes 3 times a day as needed for Dry Eyes. 1 Bottle 5    ketotifen (ZADITOR) 0.025 % ophthalmic solution Place 1 Drop in both eyes 2 times a day. 10 mL 3    hydrochlorothiazide (HYDRODIURIL) 25 MG TABS Take 25 mg by mouth every day.      losartan (COZAAR) 50 MG TABS Take 50 mg by mouth every day.      albuterol (VENTOLIN OR PROVENTIL) 108 (90 BASE) MCG/ACT AERS Inhale 2 Puffs by mouth every 6 hours as needed for Shortness of Breath. 2 Inhaler 11    ASPIRIN 81 MG tablet Take 81 mg by mouth every day.      sildenafil citrate (VIAGRA) 100 MG tablet Take 1 Tab by mouth as needed for Erectile Dysfunction. 9 Tab 11       Current list of administered Medications:    Current Facility-Administered Medications:     albuterol inhaler 2 Puff, 2 Puff, Inhalation, Q6HRS PRN, Geoff Brooks M.D.    aspirin (Asa) chewable tab 81 mg, 81 mg, Oral, DAILY, Geoff Brooks M.D., 81 mg at 08/27/24 1532    carboxymethylcellulose (Refresh Tears) 0.5 % ophthalmic drops 1 Drop, 1 Drop, Both Eyes, PRN, Geoff Brooks M.D.    losartan (Cozaar) tablet 50 mg, 50 mg, Oral, DAILY, Geoff Brooks M.D., 50 mg at 08/27/24 1532    Respiratory Therapy Consult, , Nebulization, Continuous RT, Geoff Brooks M.D.    NS infusion, , Intravenous, Continuous, Geoff Brooks M.D.    acetaminophen (Tylenol) tablet 650 mg, 650 mg, Oral, Q6HRS PRN, Geoff Brooks M.D.    Notify provider if pain remains uncontrolled, , , CONTINUOUS **AND** Use the Numeric Rating Scale (NRS), Stoner-Baker Faces (WBF), or FLACC on regular floors and Critical-Care Pain Observation Tool (CPOT) on ICUs/Trauma to assess pain, , , CONTINUOUS **AND** Pulse Ox, , ,  CONTINUOUS **AND** Pharmacy Consult Request ...Pain Management Review 1 Each, 1 Each, Other, PHARMACY TO DOSE **AND** If patient difficult to arouse and/or has respiratory depression (respiratory rate of 10 or less), stop any opiates that are currently infusing and call a Rapid Response., , , CONTINUOUS, Geoff Brooks M.D.    oxyCODONE immediate-release (Roxicodone) tablet 2.5 mg, 2.5 mg, Oral, Q3HRS PRN **OR** oxyCODONE immediate-release (Roxicodone) tablet 5 mg, 5 mg, Oral, Q3HRS PRN **OR** morphine 4 MG/ML injection 2 mg, 2 mg, Intravenous, Q3HRS PRN, Geoff Brooks M.D.    senna-docusate (Pericolace Or Senokot S) 8.6-50 MG per tablet 2 Tablet, 2 Tablet, Oral, Q EVENING **AND** polyethylene glycol/lytes (Miralax) Packet 1 Packet, 1 Packet, Oral, QDAY PRN, Geoff Brooks M.D.    labetalol (Normodyne/Trandate) injection 10 mg, 10 mg, Intravenous, Q4HRS PRN, Geoff Brooks M.D.    ondansetron (Zofran) syringe/vial injection 4 mg, 4 mg, Intravenous, Q4HRS PRN, Geoff Brooks M.D.    ondansetron (Zofran ODT) dispertab 4 mg, 4 mg, Oral, Q4HRS PRN, Geoff Brooks M.D.    atorvastatin (Lipitor) tablet 10 mg, 10 mg, Oral, Q EVENING, Geoff Brooks M.D.    thiamine (Vitamin B-1) tablet 100 mg, 100 mg, Oral, DAILY **AND** multivitamin tablet 1 Tablet, 1 Tablet, Oral, DAILY **AND** folic acid (Folvite) tablet 1 mg, 1 mg, Oral, DAILY, Geoff Brooks M.D.    omeprazole (PriLOSEC) capsule 20 mg, 20 mg, Oral, DAILY, Geoff Brooks M.D.    Past Medical History:   Diagnosis Date    Alcohol dependence syndrome     Anxiety     ASTHMA     Benign essential hypertension 11/8/2011    Carotid artery plaque 11/8/2011    Diverticulosis of colon     History of echocardiogram 11/5/2010    History of myocardial perfusion scan 11/5/2010    HLD (hyperlipidemia) 11/8/2011    Hypertension     Presence of permanent cardiac pacemaker 11/8/2011    Sinus bradycardia     Sinusitis (chronic)     Third degree AV block (HCC) 11/8/2011       Past  "Surgical History:   Procedure Laterality Date    HERNIA REPAIR      2014 - umbilical    [facility]EMAKER [facility]      [facility]    SINUSOTOMIES      TONSILLECTOMY AND ADENOIDECTOMY         Family History   Problem Relation Age of Onset    Heart Disease Father     Diabetes Father     Diabetes Brother     Cancer Sister         breast      Patient family history was personally reviewed, no pertinent family history to current presentation    Social History     Tobacco Use    Smoking status: Never    Smokeless tobacco: Never   Vaping Use    Vaping status: Never Used   Substance Use Topics    Alcohol use: No     Comment: quit     Drug use: No       ALLERGIES:  Allergies   Allergen Reactions    Sulfa Drugs Unspecified     Pt unable to recall reaction        Review of systems:  A complete review of symptoms was reviewed with the patient. This is reviewed in H&P and PMH. ALL OTHERS reviewed and negative    Physical exam:  Patient Vitals for the past 24 hrs:   BP Temp Temp src Pulse Resp SpO2 Height Weight   08/27/24 1550 (!) 158/78 36.6 °C (97.9 °F) Temporal 87 18 94 % 1.702 m (5' 7\") 90.3 kg (199 lb 1.2 oz)   08/27/24 1454 (!) 152/81 36.3 °C (97.3 °F) -- 88 18 94 % 1.702 m (5' 7\") --   08/27/24 1452 -- -- -- -- -- -- -- 90.3 kg (199 lb 1.2 oz)   08/27/24 1400 118/79 -- -- 78 20 93 % -- --   08/27/24 1153 (!) 141/69 36.6 °C (97.9 °F) Temporal 85 15 95 % -- --   08/27/24 1148 -- -- -- -- -- -- 1.702 m (5' 7\") 89.8 kg (198 lb)     General: Not in acute distress  EYES: No jaundice  HEENT: OP clear   Neck:  No carotid bruits, No JVD appreciated  CVS:  RRR, Normal S1, S2. No murmurs, rubs or gallops  Resp: Normal respiratory effort, lungs CTA bilaterally. No rales or rhonchi  Abdomen: Soft, non-distended, non-tender to palpation  Skin: No obvious rashes, no cyanosis  Neurological: Alert and oriented x3, moves all extremities, no focal neurologic deficits  Psychiatric: Appropriate affect  Extremities:   Extremities warm, 2+ " bilateral radial pulses.  2+ bilateral dp pulses, no lower extremity edema bilaterally    Data:  Laboratory studies personally reviewed by me:  Recent Results (from the past 24 hour(s))   EKG (NOW)    Collection Time: 24 11:47 AM   Result Value Ref Range    Report       [facility] Emergency Dept.    Test Date:  2024  Pt Name:    BEATRIZ DE LA TORRE             Department: ER  MRN:        [id number]                      Room:        14  Gender:     Male                         Technician: [id number]  :        1945                   Requested By:ER TRIAGE PROTOCOL  Order #:    [id number]                    Reading MD: Joselito Fields    Measurements  Intervals                                Axis  Rate:       85                           P:          99  NY:         207                          QRS:        268  QRSD:       161                          T:          82  QT:         411  QTc:        489    Interpretive Statements  Atrial-sensed ventricular-paced complexes  No further analysis attempted due to paced rhythm  No previous ECG available for comparison  Electronically Signed On 2024 11:55:37 PDT by Joselito Fields     CBC WITH DIFFERENTIAL    Collection Time: 24 12:08 PM   Result Value Ref Range    WBC 11.4 (H) 4.8 - 10.8 K/uL    RBC 4.94 4.70 - 6.10 M/uL    Hemoglobin 16.3 14.0 - 18.0 g/dL    Hematocrit 47.1 42.0 - 52.0 %    MCV 95.3 81.4 - 97.8 fL    MCH 33.0 27.0 - 33.0 pg    MCHC 34.6 32.3 - 36.5 g/dL    RDW 41.4 35.9 - 50.0 fL    Platelet Count 282 164 - 446 K/uL    MPV 10.3 9.0 - 12.9 fL    Neutrophils-Polys 66.30 44.00 - 72.00 %    Lymphocytes 18.00 (L) 22.00 - 41.00 %    Monocytes 13.60 (H) 0.00 - 13.40 %    Eosinophils 0.80 0.00 - 6.90 %    Basophils 0.90 0.00 - 1.80 %    Immature Granulocytes 0.40 0.00 - 0.90 %    Nucleated RBC 0.00 0.00 - 0.20 /100 WBC    Neutrophils (Absolute) 7.57 (H) 1.82 - 7.42 K/uL    Lymphs (Absolute) 2.06 1.00 - 4.80 K/uL    Monos (Absolute) 1.55  (H) 0.00 - 0.85 K/uL    Eos (Absolute) 0.09 0.00 - 0.51 K/uL    Baso (Absolute) 0.10 0.00 - 0.12 K/uL    Immature Granulocytes (abs) 0.05 0.00 - 0.11 K/uL    NRBC (Absolute) 0.00 K/uL   COMP METABOLIC PANEL    Collection Time: 08/27/24 12:08 PM   Result Value Ref Range    Sodium 138 135 - 145 mmol/L    Potassium 3.8 3.6 - 5.5 mmol/L    Chloride 102 96 - 112 mmol/L    Co2 26 20 - 33 mmol/L    Anion Gap 10.0 7.0 - 16.0    Glucose 105 (H) 65 - 99 mg/dL    Bun 12 8 - 22 mg/dL    Creatinine 0.84 0.50 - 1.40 mg/dL    Calcium 11.5 (H) 8.5 - 10.5 mg/dL    Correct Calcium 11.5 (H) 8.5 - 10.5 mg/dL    AST(SGOT) 41 12 - 45 U/L    ALT(SGPT) 40 2 - 50 U/L    Alkaline Phosphatase 65 30 - 99 U/L    Total Bilirubin 0.8 0.1 - 1.5 mg/dL    Albumin 4.0 3.2 - 4.9 g/dL    Total Protein 6.6 6.0 - 8.2 g/dL    Globulin 2.6 1.9 - 3.5 g/dL    A-G Ratio 1.5 g/dL   TROPONIN    Collection Time: 08/27/24 12:08 PM   Result Value Ref Range    Troponin T 9 6 - 19 ng/L   ESTIMATED GFR    Collection Time: 08/27/24 12:08 PM   Result Value Ref Range    GFR (CKD-EPI) 89 >60 mL/min/1.73 m 2       Imaging:  DX-CHEST-PORTABLE (1 VIEW)   Final Result      No acute cardiac or pulmonary abnormalities are identified.              EKG tracings personally reviewed by me sinus, v-paced rhythm      All pertinent features of laboratory and imaging reviewed including primary images where applicable      Principal Problem:    Fractured atrial pacemaker lead wire, initial encounter (POA: Yes)  Active Problems:    Anxiety (POA: Yes)    Benign essential hypertension (Chronic) (POA: Yes)      Overview: 9/22/09: Unremarkable renal ultrasound on 06/18/09.                HLD (hyperlipidemia) (Chronic) (POA: Yes)    GERD (gastroesophageal reflux disease) (POA: Yes)    Vitamin D deficiency disease (POA: Yes)      Overview: IMO load March 2020    Dry eyes (POA: Yes)    Leukocytosis (POA: Yes)    Hypercalcemia (POA: Yes)    Alcohol use disorder (POA: Yes)  Resolved Problems:     * No resolved hospital problems. *      Assessment / Plan:  Syncope  Concern for RV lead fracture  History of CHB s/p [facility] PPM  PAF not on OAC due to patient preference  RAMSEY  HTN    Recommendations:  Will have device interrogated while here for further evaluation. If indeed there are findings suspicious of RV lead fracture, will need RV lead revision.  Currently stable hemodynamically. Keep NPO after midnight for possible RV lead revision.  Will consult EP for further assessment.  Regarding AF history, not on OAC due to patient preference.   Continue losartan for BP  Keep NPO after midnight  Will continue to follow    I personally discussed his case with  Dr Geoff Brooks M.D.    No future appointments.    It is my pleasure to participate in the care of Mr. Esparza.  Please do not hesitate to contact me with questions or concerns.    Noah Nicolas MD  Cardiologist, [facility]    8/27/2024    Please note that this dictation was created using voice recognition software. I have made every reasonable attempt to correct obvious errors, but it is possible there are errors of grammar and possibly content that I did not discover before finalizing the note.

## 2024-08-27 NOTE — ASSESSMENT & PLAN NOTE
Optimize blood pressure management keep systolic blood pressure less than 140 diastolic under 90  Continue with losartan 50 mg daily  Labetalol as needed

## 2024-08-27 NOTE — ED TRIAGE NOTES
"Chief Complaint   Patient presents with    Syncope     [facility], pt states he was walking to his cardiologist apt and had a syncopal event lasting 2 seconds, pt got up and went to his cardio apt and was told his pacemaker wire is fractured was told to go to ER and [facility], pt denies any chest pain, lightheaded or any symptoms      BP (!) 141/69   Pulse 85   Resp 15   Ht 1.702 m (5' 7\")   Wt 89.8 kg (198 lb)   SpO2 95%   BMI 31.01 kg/m²     "

## 2024-08-27 NOTE — H&P
Hospital Medicine History & Physical Note    Date of Service  8/27/2024    Primary Care Physician  Beatriz Damian M.D.    Consultants  cardiology    Specialist Names: Dr Nicolas    Code Status  Full Code    Chief Complaint  Chief Complaint   Patient presents with    Syncope     [facility], pt states he was walking to his cardiologist apt and had a syncopal event lasting 2 seconds, pt got up and went to his cardio apt and was told his pacemaker wire is fractured was told to go to ER and [facility], pt denies any chest pain, lightheaded or any symptoms        History of Presenting Illness  Amari Esparza is a 78 y.o. male who presented 8/27/2024 with syncope when he went to [facility].  The patient pacemaker wire is apparently broken.  Patient thus was sent over to [facility] for evaluation.  Cardiology at this point will see the patient and plan on surgery for tomorrow.  Patient will be n.p.o. at midnight.  The patient does report that he drinks about 4 shots of tequila daily.  At this point it is potentially possible that the patient may be experiencing withdrawals at which point we will have to put him on CIWA protocol.  In the meantime we will give him thiamine folic acid multivitamin as well as to monitor his electrolytes.  Calcium levels are elevated and at this point we will give him fluid resuscitation and monitor calcium levels..    I discussed the plan of care with patient, family, bedside RN, and emergency room physician Dr. Yee he will and cardiologist Dr Nicolas .    Review of Systems  Review of Systems   Constitutional: Negative.  Negative for chills, diaphoresis and fever.   HENT: Negative.  Negative for nosebleeds and sinus pain.    Eyes: Negative.  Negative for double vision, photophobia, discharge and redness.   Respiratory: Negative.  Negative for cough, sputum production, shortness of breath, wheezing and stridor.    Cardiovascular: Negative.   Negative for chest pain, orthopnea, leg swelling and PND.   Gastrointestinal: Negative.  Negative for abdominal pain, blood in stool and heartburn.   Genitourinary: Negative.  Negative for dysuria, flank pain and frequency.   Musculoskeletal:  Positive for joint pain (Right knee and left wrist). Negative for back pain, falls and myalgias.   Skin: Negative.  Negative for itching.   Neurological:  Positive for loss of consciousness. Negative for dizziness, tingling, sensory change, focal weakness and seizures.   Endo/Heme/Allergies: Negative.  Negative for polydipsia. Does not bruise/bleed easily.   Psychiatric/Behavioral: Negative.  Negative for depression, substance abuse and suicidal ideas. The patient does not have insomnia.    All other systems reviewed and are negative.      Past Medical History   has a past medical history of Alcohol dependence syndrome, Anxiety, ASTHMA, Benign essential hypertension (11/8/2011), Carotid artery plaque (11/8/2011), Diverticulosis of colon, History of echocardiogram (11/5/2010), History of myocardial perfusion scan (11/5/2010), HLD (hyperlipidemia) (11/8/2011), Hypertension, Presence of permanent cardiac pacemaker (11/8/2011), Sinus bradycardia, Sinusitis (chronic), and Third degree AV block (HCC) (11/8/2011).    Surgical History   has a past surgical history that includes pacemaker insertion; tonsillectomy and adenoidectomy; sinusotomies; and hernia repair.     Family History  family history includes Cancer in his sister; Diabetes in his brother and father; Heart Disease in his father.   Family history reviewed with patient. There is family history that is pertinent to the chief complaint.     Social History   reports that he has never smoked. He has never used smokeless tobacco. He reports that he does not drink alcohol and does not use drugs.    Allergies  Allergies   Allergen Reactions    Sulfa Drugs Unspecified     Pt unable to recall reaction        Medications  Prior to  Admission Medications   Prescriptions Last Dose Informant Patient Reported? Taking?   ASPIRIN 81 MG tablet 8/26/2024 at AM Patient Yes Yes   Sig: Take 81 mg by mouth every day.   albuterol (VENTOLIN OR PROVENTIL) 108 (90 BASE) MCG/ACT AERS 8/26/2024 at PM Patient No Yes   Sig: Inhale 2 Puffs by mouth every 6 hours as needed for Shortness of Breath.   artificial tear (TEARS) 0.4 % ophthalmic solution 8/27/2024 at AM Patient No Yes   Sig: Place 1 Drop in both eyes 3 times a day as needed for Dry Eyes.   cetirizine (ZYRTEC) 10 MG Tab 8/26/2024 at AM Patient Yes Yes   Sig: Take 10 mg by mouth every day.   hydrochlorothiazide (HYDRODIURIL) 25 MG TABS 8/26/2024 at AM Patient Yes Yes   Sig: Take 25 mg by mouth every day.   indomethacin (INDOCIN) 25 MG Cap FEW WEEKS AGO at PRN Patient Yes Yes   Sig: Take 25 mg by mouth 1 time a day as needed. Indications: Acute Joint Inflammation in Gout   ketotifen (ZADITOR) 0.025 % ophthalmic solution 8/26/2024 at PM Patient No Yes   Sig: Place 1 Drop in both eyes 2 times a day.   losartan (COZAAR) 50 MG TABS 8/26/2024 at AM Patient Yes Yes   Sig: Take 50 mg by mouth every day.   naltrexone (DEPADE) 50 MG Tab 8/26/2024 at AM Patient Yes Yes   Sig: Take 50 mg by mouth every day.   sildenafil citrate (VIAGRA) 100 MG tablet PRN at PRN Patient No No   Sig: Take 1 Tab by mouth as needed for Erectile Dysfunction.   tamsulosin (FLOMAX) 0.4 MG capsule 8/26/2024 at AM Patient Yes Yes   Sig: Take 0.4 mg by mouth every day.      Facility-Administered Medications: None       Physical Exam  Temp:  [36.6 °C (97.9 °F)] 36.6 °C (97.9 °F)  Pulse:  [85] 85  Resp:  [15] 15  BP: (141)/(69) 141/69  SpO2:  [95 %] 95 %  Blood Pressure : (!) 141/69   Temperature: 36.6 °C (97.9 °F)   Pulse: 85   Respiration: 15   Pulse Oximetry: 95 %       Physical Exam  Vitals and nursing note reviewed. Exam conducted with a chaperone present.   Constitutional:       General: He is not in acute distress.     Appearance: Normal  appearance. He is well-developed and normal weight. He is not ill-appearing, toxic-appearing or diaphoretic.   HENT:      Head: Normocephalic and atraumatic.      Right Ear: External ear normal.      Left Ear: External ear normal.      Nose: Nose normal. No congestion or rhinorrhea.      Mouth/Throat:      Mouth: Mucous membranes are moist.      Pharynx: Oropharynx is clear. No oropharyngeal exudate or posterior oropharyngeal erythema.   Eyes:      General:         Right eye: No discharge.         Left eye: No discharge.      Extraocular Movements: Extraocular movements intact.      Conjunctiva/sclera: Conjunctivae normal.      Pupils: Pupils are equal, round, and reactive to light.   Neck:      Thyroid: No thyromegaly.      Vascular: No carotid bruit or JVD.   Cardiovascular:      Rate and Rhythm: Normal rate and regular rhythm.      Pulses: Normal pulses.      Heart sounds: Normal heart sounds. No murmur heard.     No friction rub.   Pulmonary:      Effort: Pulmonary effort is normal.      Breath sounds: Normal breath sounds.   Chest:      Chest wall: No tenderness.   Abdominal:      General: Abdomen is flat. Bowel sounds are normal. There is distension.      Palpations: Abdomen is soft. There is no mass.      Tenderness: There is no abdominal tenderness. There is no guarding or rebound.   Musculoskeletal:         General: Normal range of motion.      Cervical back: Normal range of motion and neck supple.      Right lower leg: No edema.      Left lower leg: No edema.   Lymphadenopathy:      Cervical: No cervical adenopathy.   Skin:     General: Skin is warm and dry.      Capillary Refill: Capillary refill takes more than 3 seconds.      Findings: No rash.   Neurological:      General: No focal deficit present.      Mental Status: He is alert and oriented to person, place, and time. Mental status is at baseline.      GCS: GCS eye subscore is 4. GCS verbal subscore is 5. GCS motor subscore is 6.      Cranial Nerves:  "No cranial nerve deficit.      Deep Tendon Reflexes: Reflexes are normal and symmetric.   Psychiatric:         Mood and Affect: Mood normal.         Behavior: Behavior normal.         Thought Content: Thought content normal.         Judgment: Judgment normal.         Laboratory:  Recent Labs     08/27/24  1208   WBC 11.4*   RBC 4.94   HEMOGLOBIN 16.3   HEMATOCRIT 47.1   MCV 95.3   MCH 33.0   MCHC 34.6   RDW 41.4   PLATELETCT 282   MPV 10.3     Recent Labs     08/27/24  1208   SODIUM 138   POTASSIUM 3.8   CHLORIDE 102   CO2 26   GLUCOSE 105*   BUN 12   CREATININE 0.84   CALCIUM 11.5*     Recent Labs     08/27/24  1208   ALTSGPT 40   ASTSGOT 41   ALKPHOSPHAT 65   TBILIRUBIN 0.8   GLUCOSE 105*         No results for input(s): \"NTPROBNP\" in the last 72 hours.      Recent Labs     08/27/24  1208   TROPONINT 9       Imaging:  DX-CHEST-PORTABLE (1 VIEW)   Final Result      No acute cardiac or pulmonary abnormalities are identified.          X-Ray:  I have personally reviewed the images and compared with prior images.  EKG:  I have personally reviewed the images and compared with prior images.    Assessment/Plan:  Justification for Admission Status  I anticipate this patient will require at least two midnights for appropriate medical management, necessitating inpatient admission because patient has a fractured pacemaker wire which caused him to syncopized today.  The patient was sent in from an outside facility for evaluation will need pacemaker replacement and this will require at least 48 hours of inpatient management.    Patient will need a Telemetry bed on MEDICAL service .  The need is secondary to malfunctioning pacemaker wire.    * Fractured atrial pacemaker lead wire, initial encounter- (present on admission)  Assessment & Plan  Patient today [facility] because [facility] lead wire.  Cardiology has been consulted  Patient will need the wire and pacemaker most likely " replaced.  N.p.o. at midnight as they are planning to do surgery tomorrow    Hypercalcemia- (present on admission)  Assessment & Plan  Hypercalcemia  Avoid calcium supplementation  Give hydration    Leukocytosis- (present on admission)  Assessment & Plan  Mild leukocytosis secondary to most likely stress response  Continue to monitor    Benign essential hypertension- (present on admission)  Assessment & Plan  Optimize blood pressure management keep systolic blood pressure less than 140 diastolic under 90  Continue with losartan 50 mg daily  Labetalol as needed    Alcohol use disorder- (present on admission)  Assessment & Plan  Patient tells me he drinks 4 shots of tequila every day and he has been doing this for 60 years.  He has no interest in quitting, attempted to discuss cessation with him.    Dry eyes- (present on admission)  Assessment & Plan  Continue with saline eyedrops    Vitamin D deficiency disease- (present on admission)  Assessment & Plan  Vitamin D supplementation    GERD (gastroesophageal reflux disease)- (present on admission)  Assessment & Plan  PPI therapy    HLD (hyperlipidemia)- (present on admission)  Assessment & Plan  Low-fat low-cholesterol diet  Statin  Fasting lipid panel    Anxiety- (present on admission)  Assessment & Plan  Chronic anxiety on Valium as needed every 8 hours.        VTE prophylaxis: SCDs/TEDs

## 2024-08-27 NOTE — ASSESSMENT & PLAN NOTE
Patient today was sent in by [facility] because of a broken pacemaker lead wire.  Cardiology has been consulted  Patient will need the wire and pacemaker most likely replaced.  N.p.o. at midnight as they are planning to do surgery tomorrow

## 2024-08-27 NOTE — ED NOTES
Med Rec completed per patient and med list from [facility]  Allergies reviewed    Patient states that he completed an unknown antibiotic about 2 weeks ago    Patient is not taking anticoagulants

## 2024-08-27 NOTE — ED PROVIDER NOTES
ED Provider Note    Scribed for Dr. Fields by Carri Mary. 8/27/2024,  12:10 PM.      CHIEF COMPLAINT  Chief Complaint   Patient presents with    Syncope     [facility] sterling Mtz [facility], pt states he was walking to his cardiologist apt and had a syncopal event lasting 2 seconds, pt got up and went to his cardio apt and was told his pacemaker wire is fractured was told to go to ER and [facility], pt denies any chest pain, lightheaded or any symptoms        EXTERNAL RECORDS REVIEWED  Outpatient Notes Note from Dr. ZAID Sewell that says that the patient was to come to [facility] for a lead replacement.    HPI  LIMITATION TO HISTORY   Select: : None  OUTSIDE HISTORIAN(S):  None    Amari Esparza is a 78 y.o. male who presents to the Emergency Department via EMS for acute syncope onset prior to arrival. The patient notes that [facility] due to a fracture in his pacemaker. He states that as he was walking to the front door of the office, when he became lightheaded and passed out for about 1-2 second. He denies hitting his head on the ground, but reports that he landed on his knees. He was sent here to have his lead replaced. The patient states that he has lost consciousness before due to his asthma. He states that he was able to walk after the fall. Denies any abdominal pain or chest pain. The patient has a history of hypertension.     REVIEW OF SYSTEMS  See HPI for further details. All other systems are negative.     PAST MEDICAL HISTORY     Past Medical History:   Diagnosis Date    Alcohol dependence syndrome     Anxiety     ASTHMA     Benign essential hypertension 11/8/2011    Carotid artery plaque 11/8/2011    Diverticulosis of colon     History of echocardiogram 11/5/2010    History of myocardial perfusion scan 11/5/2010    HLD (hyperlipidemia) 11/8/2011    Hypertension     Presence of permanent cardiac pacemaker 11/8/2011    Sinus bradycardia     Sinusitis (chronic)  "    Third degree AV block (HCC) 11/8/2011       SURGICAL HISTORY  Past Surgical History:   Procedure Laterality Date    HERNIA REPAIR      2014 - umbilical    PACEMAKER [facility]ION      [facility]    SINUSOTOMIES      TONSILLECTOMY AND ADENOIDECTOMY         FAMILY HISTORY  Family History   Problem Relation Age of Onset    Heart Disease Father     Diabetes Father     Diabetes Brother     Cancer Sister         breast        SOCIAL HISTORY    reports that he has never smoked. He has never used smokeless tobacco. He reports that he does not drink alcohol and does not use drugs.    CURRENT MEDICATIONS  Home Medications       Reviewed by Nakul Alanis R.N. (Registered Nurse) on 08/27/24 at 1155  Med List Status: Not Addressed     Medication Last Dose Status   albuterol (VENTOLIN OR PROVENTIL) 108 (90 BASE) MCG/ACT AERS  Active   artificial tear (TEARS) 0.4 % ophthalmic solution  Active   ASPIRIN 81 MG tablet  Active   cholecalciferol (VITAMIN D) 400 UNIT Cap  Active   clindamycin (CLEOCIN) 150 MG Cap  Active   diazepam (VALIUM) 5 MG Tab  Active   hydrochlorothiazide (HYDRODIURIL) 25 MG TABS  Active   indomethacin SR (INDOCIN SR) 75 MG Cap CR  Active   ketotifen (ZADITOR) 0.025 % ophthalmic solution  Active   losartan (COZAAR) 50 MG TABS  Active   methocarbamol (ROBAXIN) 500 MG Tab  Active   pantoprazole (PROTONIX) 40 MG TBEC  Active   sildenafil citrate (VIAGRA) 100 MG tablet  Active                  Audit from Redirected Encounters    **Home medications have not yet been reviewed for this encounter**         ALLERGIES  Allergies   Allergen Reactions    Sulfa Drugs Unspecified     Pt unable to recall reaction        PHYSICAL EXAM  VITAL SIGNS: BP (!) 141/69   Pulse 85   Temp 36.6 °C (97.9 °F) (Temporal)   Resp 15   Ht 1.702 m (5' 7\")   Wt 89.8 kg (198 lb)   SpO2 95%   BMI 31.01 kg/m²   Gen: Alert  HENT: ATNC  Eyes: Normal conjunctiva  Neck: trachea midline  Resp: no respiratory distress, CTAB  CV: No JVD, " RRR, no m/r/g. Equal radial pulses  Abd: non-distended, non-tender  Ext: No deformities, no edema  Psych: normal mood  Neuro: speech fluent      DIAGNOSTIC STUDIES / PROCEDURES  EKG  I independently interpreted this EKG.  Results for orders placed or performed during the hospital encounter of 24   EKG (NOW)   Result Value Ref Range    Report       [facility] Emergency Dept.    Test Date:  2024  Pt Name:    BEATRIZ DE LA TORRE             Department: ER  MRN:        [id number]                      Room:        14  Gender:     Male                         Technician: [id number]  :        [id number]                   Requested By:ER TRIAGE PROTOCOL  Order #:    [id number]                    Reading MD: Joselito Fields    Measurements  Intervals                                Axis  Rate:       85                           P:          99  UT:         207                          QRS:        268  QRSD:       161                          T:          82  QT:         411  QTc:        489    Interpretive Statements  Atrial-sensed ventricular-paced complexes  No further analysis attempted due to paced rhythm  No previous ECG available for comparison  Electronically Signed On 2024 11:55:37 PDT by Joselito Fields         LABS  Labs Reviewed   CBC WITH DIFFERENTIAL - Abnormal; Notable for the following components:       Result Value    WBC 11.4 (*)     Lymphocytes 18.00 (*)     Monocytes 13.60 (*)     Neutrophils (Absolute) 7.57 (*)     Monos (Absolute) 1.55 (*)     All other components within normal limits   COMP METABOLIC PANEL - Abnormal; Notable for the following components:    Glucose 105 (*)     Calcium 11.5 (*)     Correct Calcium 11.5 (*)     All other components within normal limits   TROPONIN   ESTIMATED GFR         RADIOLOGY  I have independently interpreted the diagnostic imaging associated with this visit.  My preliminary interpretation is as follows: Chest x-ray: No pneumonia, no obvious wire  fractures  Radiologist interpretation:    DX-CHEST-PORTABLE (1 VIEW)   Final Result      No acute cardiac or pulmonary abnormalities are identified.          COURSE & MEDICAL DECISION MAKING  Pertinent Labs & Imaging studies were reviewed. (See chart for details)    ED Observation Status? No, the patient does not meet the criteria for ED Observation.   -----------    12:10 PM Patient seen and examined at bedside. Discussed the plan of care, including ordering labs, imaging, and EKG to further evaluate. The patient verbalizes their understanding and agreement to the plan of care.      12:41 PM - I discussed the patient's case and the above findings with Dr. Nicolas (Cardiology) who recommends that the patient be admitted to medicine and that he will consult on the case to determine if the patient needs intervention. The patient was updated at this time. Discussed lab and radiology results with the patient and plan for admission. Patient verbalizes understanding and agreement to this plan of care.      1:02 PM - I discussed the patient's case and the above findings with Dr. Brooks (Hospitalist) who agrees to evaluate the patient for admission.     INITIAL ASSESSMENT AND PLAN  Medical Decision Making: Patient presents with a syncopal episode in the setting of abnormal findings from his cardiologist regarding his pacemaker concerning for possible right ventricle lead failure.  The patient currently is paced.  He is asymptomatic.  No evidence of ACS.  No stigmata of DVT/PE.  EKG demonstrates paced rhythm but no evidence for current Sgarbossa criteria.  Case discussed with cardiology, who recommends hospitalization and will evaluate for potential lead replacement    ADDITIONAL PROBLEM LIST AND DISPOSITION      I have discussed management of the patient with the following medical professionals:  Dr. Nicolas (Cardiology), Dr. Brooks (Hospitalist)       Decision tools and prescription drugs considered including, but not limited  to:  Heart score:4 .    DISPOSITION:  Patient will be hospitalized by Dr. Brooks in guarded condition.     FINAL IMPRESSION  1. Syncope, unspecified syncope type    2. Failure of pacemaker lead, initial encounter       Carri ROA (Scribe), am scribing for, and in the presence of, Joselito Fields M.D..    Electronically signed by: Carri Mary (Scribe), 8/27/2024    IJoselito M.D. personally performed the services described in this documentation, as scribed by Carri Mary in my presence, and it is both accurate and complete.    The note accurately reflects work and decisions made by me.  Joselito Fields M.D.  8/27/2024  7:37 PM      This dictation was created using voice recognition software. The accuracy of the dictation is limited to the abilities of the software. I expect there may be some errors of grammar and possibly content. The nursing notes were reviewed and certain aspects of this information were incorporated into this note.

## 2024-08-27 NOTE — PROGRESS NOTES
4 Eyes Skin Assessment Completed by ROBERT Davis and ROBERT Knowles.    Head WDL  Ears WDL  Nose WDL  Mouth WDL  Neck WDL  Breast/Chest WDL  Shoulder Blades WDL  Spine WDL  (R) Arm/Elbow/Hand WDL  (L) Arm/Elbow/Hand WDL  Abdomen Bruising  Groin WDL  Scrotum/Coccyx/Buttocks Redness and Blanching  (R) Leg Abrasion noted  (L) Leg WDL  (R) Heel/Foot/Toe WDL  (L) Heel/Foot/Toe WDL          Devices In Places Tele Box and Pulse Ox      Interventions In Place Pillows    Possible Skin Injury No    Pictures Uploaded Into Epic Yes  Wound Consult Placed No  RN Wound Prevention Protocol Ordered No

## 2024-08-28 ENCOUNTER — APPOINTMENT (OUTPATIENT)
Dept: RADIOLOGY | Facility: MEDICAL CENTER | Age: 79
End: 2024-08-28
Attending: INTERNAL MEDICINE
Payer: MEDICARE

## 2024-08-28 ENCOUNTER — PHARMACY VISIT (OUTPATIENT)
Dept: PHARMACY | Facility: MEDICAL CENTER | Age: 79
End: 2024-08-28
Payer: COMMERCIAL

## 2024-08-28 ENCOUNTER — APPOINTMENT (OUTPATIENT)
Dept: CARDIOLOGY | Facility: MEDICAL CENTER | Age: 79
End: 2024-08-28
Attending: INTERNAL MEDICINE
Payer: MEDICARE

## 2024-08-28 VITALS
WEIGHT: 199.3 LBS | SYSTOLIC BLOOD PRESSURE: 127 MMHG | BODY MASS INDEX: 31.28 KG/M2 | HEIGHT: 67 IN | DIASTOLIC BLOOD PRESSURE: 72 MMHG | HEART RATE: 80 BPM | RESPIRATION RATE: 16 BRPM | OXYGEN SATURATION: 95 % | TEMPERATURE: 97.3 F

## 2024-08-28 LAB
ANION GAP SERPL CALC-SCNC: 8 MMOL/L (ref 7–16)
BUN SERPL-MCNC: 14 MG/DL (ref 8–22)
CALCIUM SERPL-MCNC: 11.3 MG/DL (ref 8.5–10.5)
CHLORIDE SERPL-SCNC: 103 MMOL/L (ref 96–112)
CO2 SERPL-SCNC: 28 MMOL/L (ref 20–33)
CREAT SERPL-MCNC: 0.81 MG/DL (ref 0.5–1.4)
EKG IMPRESSION: NORMAL
ERYTHROCYTE [DISTWIDTH] IN BLOOD BY AUTOMATED COUNT: 42.9 FL (ref 35.9–50)
GFR SERPLBLD CREATININE-BSD FMLA CKD-EPI: 90 ML/MIN/1.73 M 2
GLUCOSE SERPL-MCNC: 147 MG/DL (ref 65–99)
HCT VFR BLD AUTO: 46.2 % (ref 42–52)
HGB BLD-MCNC: 15.8 G/DL (ref 14–18)
MAGNESIUM SERPL-MCNC: 1.7 MG/DL (ref 1.5–2.5)
MCH RBC QN AUTO: 33 PG (ref 27–33)
MCHC RBC AUTO-ENTMCNC: 34.2 G/DL (ref 32.3–36.5)
MCV RBC AUTO: 96.5 FL (ref 81.4–97.8)
PHOSPHATE SERPL-MCNC: 1.3 MG/DL (ref 2.5–4.5)
PLATELET # BLD AUTO: 267 K/UL (ref 164–446)
PMV BLD AUTO: 10.2 FL (ref 9–12.9)
POTASSIUM SERPL-SCNC: 3.9 MMOL/L (ref 3.6–5.5)
RBC # BLD AUTO: 4.79 M/UL (ref 4.7–6.1)
SODIUM SERPL-SCNC: 139 MMOL/L (ref 135–145)
WBC # BLD AUTO: 9.4 K/UL (ref 4.8–10.8)

## 2024-08-28 PROCEDURE — 700111 HCHG RX REV CODE 636 W/ 250 OVERRIDE (IP): Mod: JG

## 2024-08-28 PROCEDURE — 700102 HCHG RX REV CODE 250 W/ 637 OVERRIDE(OP): Performed by: HOSPITALIST

## 2024-08-28 PROCEDURE — 99239 HOSP IP/OBS DSCHRG MGMT >30: CPT | Performed by: STUDENT IN AN ORGANIZED HEALTH CARE EDUCATION/TRAINING PROGRAM

## 2024-08-28 PROCEDURE — 80048 BASIC METABOLIC PNL TOTAL CA: CPT

## 2024-08-28 PROCEDURE — 84100 ASSAY OF PHOSPHORUS: CPT

## 2024-08-28 PROCEDURE — 85027 COMPLETE CBC AUTOMATED: CPT

## 2024-08-28 PROCEDURE — 700102 HCHG RX REV CODE 250 W/ 637 OVERRIDE(OP): Performed by: NURSE PRACTITIONER

## 2024-08-28 PROCEDURE — 99153 MOD SED SAME PHYS/QHP EA: CPT

## 2024-08-28 PROCEDURE — A9270 NON-COVERED ITEM OR SERVICE: HCPCS | Performed by: NURSE PRACTITIONER

## 2024-08-28 PROCEDURE — A9270 NON-COVERED ITEM OR SERVICE: HCPCS | Performed by: HOSPITALIST

## 2024-08-28 PROCEDURE — 33216 INSERT 1 ELECTRODE PM-DEFIB: CPT | Performed by: INTERNAL MEDICINE

## 2024-08-28 PROCEDURE — RXMED WILLOW AMBULATORY MEDICATION CHARGE: Performed by: STUDENT IN AN ORGANIZED HEALTH CARE EDUCATION/TRAINING PROGRAM

## 2024-08-28 PROCEDURE — 700101 HCHG RX REV CODE 250

## 2024-08-28 PROCEDURE — 71045 X-RAY EXAM CHEST 1 VIEW: CPT

## 2024-08-28 PROCEDURE — 93005 ELECTROCARDIOGRAM TRACING: CPT | Performed by: INTERNAL MEDICINE

## 2024-08-28 PROCEDURE — 99152 MOD SED SAME PHYS/QHP 5/>YRS: CPT | Performed by: INTERNAL MEDICINE

## 2024-08-28 PROCEDURE — 700105 HCHG RX REV CODE 258: Performed by: HOSPITALIST

## 2024-08-28 PROCEDURE — 83735 ASSAY OF MAGNESIUM: CPT

## 2024-08-28 PROCEDURE — 02HK3JZ INSERTION OF PACEMAKER LEAD INTO RIGHT VENTRICLE, PERCUTANEOUS APPROACH: ICD-10-PCS | Performed by: INTERNAL MEDICINE

## 2024-08-28 PROCEDURE — 93010 ELECTROCARDIOGRAM REPORT: CPT | Performed by: INTERNAL MEDICINE

## 2024-08-28 RX ORDER — MIDAZOLAM HYDROCHLORIDE 1 MG/ML
INJECTION INTRAMUSCULAR; INTRAVENOUS
Status: COMPLETED
Start: 2024-08-28 | End: 2024-08-28

## 2024-08-28 RX ORDER — CEFAZOLIN SODIUM 1 G/3ML
INJECTION, POWDER, FOR SOLUTION INTRAMUSCULAR; INTRAVENOUS
Status: COMPLETED
Start: 2024-08-28 | End: 2024-08-28

## 2024-08-28 RX ORDER — ACETAMINOPHEN 325 MG/1
650 TABLET ORAL EVERY 4 HOURS PRN
Status: DISCONTINUED | OUTPATIENT
Start: 2024-08-28 | End: 2024-08-28 | Stop reason: HOSPADM

## 2024-08-28 RX ORDER — DOXYCYCLINE 100 MG/1
100 TABLET ORAL EVERY 12 HOURS
Qty: 8 TABLET | Refills: 0 | Status: ACTIVE | OUTPATIENT
Start: 2024-08-28 | End: 2024-09-01

## 2024-08-28 RX ORDER — ONDANSETRON 2 MG/ML
4 INJECTION INTRAMUSCULAR; INTRAVENOUS EVERY 6 HOURS PRN
Status: DISCONTINUED | OUTPATIENT
Start: 2024-08-28 | End: 2024-08-28 | Stop reason: HOSPADM

## 2024-08-28 RX ORDER — DOXYCYCLINE 100 MG/1
100 TABLET ORAL EVERY 12 HOURS
Status: DISCONTINUED | OUTPATIENT
Start: 2024-08-28 | End: 2024-08-28 | Stop reason: HOSPADM

## 2024-08-28 RX ORDER — LIDOCAINE HYDROCHLORIDE 20 MG/ML
INJECTION, SOLUTION INFILTRATION; PERINEURAL
Status: COMPLETED
Start: 2024-08-28 | End: 2024-08-28

## 2024-08-28 RX ORDER — BUPIVACAINE HYDROCHLORIDE 5 MG/ML
INJECTION, SOLUTION EPIDURAL; INTRACAUDAL
Status: COMPLETED
Start: 2024-08-28 | End: 2024-08-28

## 2024-08-28 RX ADMIN — FENTANYL CITRATE 100 MCG: 50 INJECTION, SOLUTION INTRAMUSCULAR; INTRAVENOUS at 10:15

## 2024-08-28 RX ADMIN — MIDAZOLAM HYDROCHLORIDE 0.5 MG: 2 INJECTION, SOLUTION INTRAMUSCULAR; INTRAVENOUS at 10:30

## 2024-08-28 RX ADMIN — MIDAZOLAM HYDROCHLORIDE 2 MG: 2 INJECTION, SOLUTION INTRAMUSCULAR; INTRAVENOUS at 10:15

## 2024-08-28 RX ADMIN — LOSARTAN POTASSIUM 50 MG: 50 TABLET, FILM COATED ORAL at 04:44

## 2024-08-28 RX ADMIN — FENTANYL CITRATE 25 MCG: 50 INJECTION, SOLUTION INTRAMUSCULAR; INTRAVENOUS at 10:45

## 2024-08-28 RX ADMIN — SODIUM CHLORIDE: 9 INJECTION, SOLUTION INTRAVENOUS at 08:40

## 2024-08-28 RX ADMIN — ALBUTEROL SULFATE 2 PUFF: 90 AEROSOL, METERED RESPIRATORY (INHALATION) at 07:09

## 2024-08-28 RX ADMIN — ASPIRIN 81 MG: 81 TABLET, CHEWABLE ORAL at 04:44

## 2024-08-28 RX ADMIN — CEFAZOLIN 3 G: 1 INJECTION, POWDER, FOR SOLUTION INTRAMUSCULAR; INTRAVENOUS at 10:15

## 2024-08-28 RX ADMIN — BUPIVACAINE HYDROCHLORIDE: 5 INJECTION, SOLUTION EPIDURAL; INTRACAUDAL at 09:30

## 2024-08-28 RX ADMIN — LIDOCAINE HYDROCHLORIDE: 20 INJECTION, SOLUTION INFILTRATION; PERINEURAL at 09:30

## 2024-08-28 RX ADMIN — DOXYCYCLINE 100 MG: 100 TABLET, FILM COATED ORAL at 13:44

## 2024-08-28 RX ADMIN — ALBUTEROL SULFATE 2 PUFF: 90 AEROSOL, METERED RESPIRATORY (INHALATION) at 14:27

## 2024-08-28 ASSESSMENT — PAIN DESCRIPTION - PAIN TYPE
TYPE: ACUTE PAIN

## 2024-08-28 ASSESSMENT — FIBROSIS 4 INDEX: FIB4 SCORE: 1.77

## 2024-08-28 NOTE — OP REPORT
[facility] PROCEDURE NOTE    PROCEDURE PERFORMED:  Pacemaker lead insertion    DATE OF SERVICE: 8/28/2024    : Skip Leslie MD    ASSISTANT: None    ANESTHESIA: Local and moderate sedation (start time 1017, stop time 1052, total dose given 2.5 mg IV versed, 125 mcg IV fentanyl)    EBL: 30 cc    SPECIMENS: None    INDICATION(S):  Pacemaker lead malfunction  Complete heart block    COMPLICATION(S): None    DESCRIPTION OF PROCEDURE:  After informed written consent, the patient was brought to the electrophysiology lab in the fasting, unsedated state. The patient was prepped and draped in the usual sterile fashion. The procedure was performed under moderate sedation with local anesthetic. A left upper extremity venogram was performed, demonstrating a patent subclavian vein. A left infraclavicular incision was made with a scalpel and the pectoral device pocket was accessed using a combination of blunt dissection and electrocautery. The old generator and leads were freed from adhesions and taken out of the pocket. The modified Seldinger technique was used to gain access to the left axillary vein. A peel-away hemostasis sheath was placed in the vein. Under fluoroscopic guidance, the pacemaker leads were introduced into the heart. At this point we noted intermittent loss of capture from the RV lead. Briefly required external pacing. The old RV lead was disconnected from the pulse generator and pacing was done using cables at high output in unipolar configuration which had capture. The new ventricular lead was advanced to the RVOT and then lowered into position at the RV apex. The lead was tested and had satisfactory sensing and pacing parameters. High output ventricular pacing did not produce extracardiac stimulation. The lead was sutured to the underlying pectoral muscle with interrupted silk over a silastic suture sleeve. The new lead was connected to the existing pulse generator and inserted back into the  pocket with TYRX antibiotic sleeve. The old RV lead was capped. The device pocket was irrigated with antibiotic solution, inspected, and no bleeding was seen. The wound was closed with three layers of absorbable sutures. Following recovery from sedation, the patient was transferred to a monitored bed in good condition.     DEVICE INFORMATION:  Pulse generator is a Abbott model DN7933  Serial # [id number]    LEAD INFORMATION:  1)Right atrial lead is a Abbott model #1688TC/52, serial #[id number] ,P wave >5.0 millivolts, threshold 0.75 Volts at 0.5 milliseconds, pacing impedance 460 Ohms.    2)Right ventricular lead is a Abbott model #LSW6652P/58 , serial #[id number] ,R wave PACED millivolts, threshold 0.75 Volts at 0.5 milliseconds, pacing impedance 660 Ohms.    CAPPED RV lead is an Foss 1688, serial number [id number], implant date 4/3/2008    DEVICE PROGRAMMING:  DDD 60 -120 ppm    FLUOROSCOPY TIME: 2.5 minutes    IMPRESSIONS:  1. Successful insertion of new RV lead    RECOMMENDATIONS:  1. Transfer to monitored bed  2. Chest x-ray  3. Device interrogation prior to hospital discharge  4. Followup in device clinic

## 2024-08-28 NOTE — PROGRESS NOTES
Arm sling sized and placed on patient's LUE. Patient expressed desire to not have it on at this time due to impending discharge, but understands to have it on following discharge.

## 2024-08-28 NOTE — PROGRESS NOTES
Monitor Summary  Rhythm: Paced  Rate: 66-85  Ectopy: rare PVC, rare PAC, rare failure to capture  .20 / .18 / .41    See media for strip

## 2024-08-28 NOTE — PROGRESS NOTES
Seen in afternoon same day discharge EP rounds.  S/P implantation of new RV lead, the old RV lead was capped and abandoned.      Conclusions per EP Op Note 8/28/2024:  Procedure(s) Performed:   DEVICE INFORMATION:  Pulse generator is a Abbott model RD0584  Serial # [id number]     LEAD INFORMATION:  1)Right atrial lead is a Abbott model #1688TC/52, serial #[id number] ,P wave >5.0 millivolts, threshold 0.75 Volts at 0.5 milliseconds, pacing impedance 460 Ohms.     2)Right ventricular lead is a Abbott model #GAC8867U/58 , serial #[id number] ,R wave PACED millivolts, threshold 0.75 Volts at 0.5 milliseconds, pacing impedance 660 Ohms.     CAPPED RV lead is an Foss 1688, serial number [id number], implant date 4/3/2008     DEVICE PROGRAMMING:  DDD 60 -120 ppm     FLUOROSCOPY TIME: 2.5 minutes     IMPRESSIONS:  1. Successful insertion of new RV lead     Monitored rhythm is atrial sensed, ventricular-paced.  Vital signs are stable. PPM site is soft with CDI dressing in place. Device interrogation showed normal sensing and function. CXR showed no evidence of PTX.      I have verified that new discharge prescriptions have been sent to correct pharmacy and provided education about importance of esophageal prophylaxis post ablation.      Discharge instructions discussed with patient:  [facility] Post Ablation Patient Instructions:  Lead Revision Discharge Instructions/[facility]    1.  No showers until seen in follow up; may take sponge bath.  Keep dressing dry & in place until seen at for you follow up visit at the cardiology office.     2.  No lifting over 10 lbs with affected arm for six weeks.  3.  Do not raise affected arm above shoulder level or behind head for six weeks.  4.  Avoid excessive pushing, pulling, or twisting for six weeks.  5.  No driving for the first week.  6.  Ok to place indirect ICE pack to site for comfort.   7.  Call our office ([phone]) if you notice any  increased swelling, redness, warmth, or drainage at the implant site.  8.  Needs to be seen in emergency if you develop fever > 101F or uncontrolled pain.  9.  Always check with device clinic before any planned MRI to see if device is MRI compatible.  10.  No routine dental work or cleanings for 3 months.  11.  May remove arm sling after one day, but please wear if you have trouble remembering to keep your arm down.  Please wear at night as a reminder.   12. Do not place cell phones or mobile devices directly over implanted device.   13. You will need to be seen at least twice in the device clinic for checks while the pacemaker is healing.  Expect appointment approximately one week after implant and 6-7 weeks post implant.

## 2024-08-28 NOTE — PROGRESS NOTES
Bedside report received from off going RN/tech: Eleni Duarte, assumed care of patient.     Fall Risk Score: LOW RISK  Fall risk interventions in place: Place yellow fall risk ID band on patient, Provide patient/family education based on risk assessment, Educate patient/family to call staff for assistance when getting out of bed, Place fall precaution signage outside patient door, and Place patient in room close to nursing station  Bed type: Regular (Sal Score less than 17 interventions in place)  Patient on cardiac monitor: Yes  IVF/IV medications: Not Applicable   Oxygen: Room Air  Bedside sitter: Not Applicable   Isolation: Not applicable    Pt opens eyes spontaneously and tracks RN, demonstrates oriented conversation to person, place, time, and event , follows commands. A&Ox4    Bedside report received from day shift RN. Pt is currently A&Ox4, Paced, breathing at a normal rate and rhythm, warm, dry, and skin color appropriate for ethnicity, and in no visible emotional or physical distress. Bed locked in lowest position, call light is within reach, fall prevention measures in place. Pt educated to use call light before getting out of bed, pt verbalized understanding. Pt is on a cardiac monitor, order verified, transmitter connected appropriately, and leads placed correctly, rhythm and rate assessed by RN, monitor staff updated of changes and parameters as necessary.  No further needs at this time.

## 2024-08-28 NOTE — DISCHARGE INSTRUCTIONS
Lead Revision Discharge Instructions/[facility]    1.  No showers until seen in follow up; may take sponge bath.  Keep dressing dry & in place until seen at for you follow up visit at the cardiology office.     2.  No lifting over 10 lbs with affected arm for six weeks.  3.  Do not raise affected arm above shoulder level or behind head for six weeks.  4.  Avoid excessive pushing, pulling, or twisting for six weeks.  5.  No driving for the first week.  6.  Ok to place indirect ICE pack to site for comfort.   7.  Call our office ([phone]) if you notice any increased swelling, redness, warmth, or drainage at the implant site.  8.  Needs to be seen in emergency if you develop fever > 101F or uncontrolled pain.  9.  Always check with device clinic before any planned MRI to see if device is MRI compatible.  10.  No routine dental work or cleanings for 3 months.  11.  May remove arm sling after one day, but please wear if you have trouble remembering to keep your arm down.  Please wear at night as a reminder.   12. Do not place cell phones or mobile devices directly over implanted device.   13. You will need to be seen at least twice in the device clinic for checks while the pacemaker is healing.  Expect appointment approximately one week after implant and 6-7 weeks post implant.

## 2024-08-28 NOTE — DISCHARGE SUMMARY
Discharge Summary    CHIEF COMPLAINT ON ADMISSION  Chief Complaint   Patient presents with    Syncope     [facility], pt states he was walking to his cardiologist apt and had a syncopal event lasting 2 seconds, pt got up and went to his cardio apt and was told his pacemaker wire is fractured was told to go to ER and [facility], pt denies any chest pain, lightheaded or any symptoms        Reason for Admission  EMS     Admission Date  8/27/2024    CODE STATUS  Full Code    HPI & HOSPITAL COURSE  This is a 78-year-old male with past medical history significant for paroxysmal atrial fibrillation not on OAC due to patient preference, history of complete heart block status post [place] PPM in 2008 with generator change in 2015 and 2022, RAMSEY who was [facility] due to concern for RV lead fracture.     [facility] the [facility] (Jose Enrique Sewell, GREG) earlier today due to concern for device malfunction.  [facility] at the [facility] and there was concern for device noise and loss of capture.  Subsequently he was sent to [facility] for RV lead revision due to concern for lead fracture.    Patient underwent pacemaker lead insertion by cardiology team. Pacemaker interrogated and functioning well. [facility]. He is prescribed short course doxycycline perioperatively. He is feeling well and discharged to home.    Therefore, he is discharged in fair and stable condition to home with close outpatient follow-up.    The patient recovered much more quickly than anticipated on admission.    Discharge Date  8/28/2024    FOLLOW UP ITEMS POST DISCHARGE  Take medications as prescribed.  Follow up with PCP, cardiology.    DISCHARGE DIAGNOSES  Principal Problem:    Fractured atrial pacemaker lead wire, initial encounter (POA: Yes)  Active Problems:    Anxiety (POA: Yes)    Benign essential hypertension (Chronic) (POA: Yes)      Overview:  9/22/09: Unremarkable renal ultrasound on 06/18/09.                HLD (hyperlipidemia) (Chronic) (POA: Yes)    GERD (gastroesophageal reflux disease) (POA: Yes)    Vitamin D deficiency disease (POA: Yes)      Overview: IMO load March 2020    Dry eyes (POA: Yes)    Leukocytosis (POA: Yes)    Hypercalcemia (POA: Yes)    Alcohol use disorder (POA: Yes)  Resolved Problems:    * No resolved hospital problems. *      FOLLOW UP  No future appointments.  GREGG MartellRJaunNJaun  [address]  [address]  [facility] [address]  [phone]    Go on 9/4/2024        MEDICATIONS ON DISCHARGE     Medication List        START taking these medications        Instructions   doxycycline monohydrate 100 MG tablet  Commonly known as: Adoxa   Take 1 Tablet by mouth every 12 hours for 4 days.  Dose: 100 mg            CONTINUE taking these medications        Instructions   albuterol 108 (90 Base) MCG/ACT Aers inhalation aerosol   Inhale 2 Puffs by mouth every 6 hours as needed for Shortness of Breath.  Dose: 2 Puff     artificial tear 0.4 % ophthalmic solution  Commonly known as: TEARS   Place 1 Drop in both eyes 3 times a day as needed for Dry Eyes.  Dose: 1 Drop     aspirin 81 MG tablet   Take 81 mg by mouth every day.  Dose: 81 mg     cetirizine 10 MG Tabs  Commonly known as: ZyrTEC   Take 10 mg by mouth every day.  Dose: 10 mg     hydroCHLOROthiazide 25 MG Tabs   Take 25 mg by mouth every day.  Dose: 25 mg     indomethacin 25 MG Caps  Commonly known as: Indocin   Take 25 mg by mouth 1 time a day as needed. Indications: Acute Joint Inflammation in Gout  Dose: 25 mg     ketotifen 0.025 % ophthalmic solution  Commonly known as: Zaditor   Place 1 Drop in both eyes 2 times a day.  Dose: 1 Drop     losartan 50 MG Tabs  Commonly known as: Cozaar   Take 50 mg by mouth every day.  Dose: 50 mg     naltrexone 50 MG Tabs  Commonly known as: Depade   Take 50 mg by mouth every day.  Dose: 50 mg     sildenafil citrate 100 MG  tablet  Commonly known as: Viagra   Take 1 Tab by mouth as needed for Erectile Dysfunction.  Dose: 100 mg     tamsulosin 0.4 MG capsule  Commonly known as: Flomax   Take 0.4 mg by mouth every day.  Dose: 0.4 mg              Allergies  Allergies   Allergen Reactions    Sulfa Drugs Unspecified     Pt unable to recall reaction        DIET  Orders Placed This Encounter   Procedures    Diet Order Diet: Cardiac     Standing Status:   Standing     Number of Occurrences:   1     Order Specific Question:   Diet:     Answer:   Cardiac [6]       ACTIVITY  As tolerated.  Weight bearing as tolerated    CONSULTATIONS  cardiology    PROCEDURES  [facility] PROCEDURE NOTE     PROCEDURE PERFORMED:  Pacemaker lead insertion     DATE OF SERVICE: 8/28/2024     : Skip Leslie MD     ASSISTANT: None     ANESTHESIA: Local and moderate sedation (start time 1017, stop time 1052, total dose given 2.5 mg IV versed, 125 mcg IV fentanyl)     EBL: 30 cc     SPECIMENS: None     INDICATION(S):  Pacemaker lead malfunction  Complete heart block     COMPLICATION(S): None     DESCRIPTION OF PROCEDURE:  After informed written consent, the patient was brought to the electrophysiology lab in the fasting, unsedated state. The patient was prepped and draped in the usual sterile fashion. The procedure was performed under moderate sedation with local anesthetic. A left upper extremity venogram was performed, demonstrating a patent subclavian vein. A left infraclavicular incision was made with a scalpel and the pectoral device pocket was accessed using a combination of blunt dissection and electrocautery. The old generator and leads were freed from adhesions and taken out of the pocket. The modified Seldinger technique was used to gain access to the left axillary vein. A peel-away hemostasis sheath was placed in the vein. Under fluoroscopic guidance, the pacemaker leads were introduced into the heart. At this point we noted intermittent loss of  capture from the RV lead. Briefly required external pacing. The old RV lead was disconnected from the pulse generator and pacing was done using cables at high output in unipolar configuration which had capture. The new ventricular lead was advanced to the RVOT and then lowered into position at the RV apex. The lead was tested and had satisfactory sensing and pacing parameters. High output ventricular pacing did not produce extracardiac stimulation. The lead was sutured to the underlying pectoral muscle with interrupted silk over a silastic suture sleeve. The new lead was connected to the existing pulse generator and inserted back into the pocket with TYRX antibiotic sleeve. The old RV lead was capped. The device pocket was irrigated with antibiotic solution, inspected, and no bleeding was seen. The wound was closed with three layers of absorbable sutures. Following recovery from sedation, the patient was transferred to a monitored bed in good condition.      DEVICE INFORMATION:  Pulse generator is a Abbott model DZ1174  Serial # [id number]     LEAD INFORMATION:  1)Right atrial lead is a Abbott model #1688TC/52, serial #[id number] ,P wave >5.0 millivolts, threshold 0.75 Volts at 0.5 milliseconds, pacing impedance 460 Ohms.     2)Right ventricular lead is a Abbott model #MYK8681N/58 , serial #[id number] ,R wave PACED millivolts, threshold 0.75 Volts at 0.5 milliseconds, pacing impedance 660 Ohms.     CAPPED RV lead is an Foss 1688, serial number [id number], implant date 4/3/2008     DEVICE PROGRAMMING:  DDD 60 -120 ppm     FLUOROSCOPY TIME: 2.5 minutes     IMPRESSIONS:  1. Successful insertion of new RV lead     RECOMMENDATIONS:  1. Transfer to monitored bed  2. Chest x-ray  3. Device interrogation prior to hospital discharge  4. Followup in device clinic     LABORATORY  Lab Results   Component Value Date    SODIUM 139 08/28/2024    POTASSIUM 3.9 08/28/2024    CHLORIDE 103 08/28/2024    CO2 28 08/28/2024    GLUCOSE 147 (H)  08/28/2024    BUN 14 08/28/2024    CREATININE 0.81 08/28/2024        Lab Results   Component Value Date    WBC 9.4 08/28/2024    HEMOGLOBIN 15.8 08/28/2024    HEMATOCRIT 46.2 08/28/2024    PLATELETCT 267 08/28/2024        Total time of the discharge process exceeds 34 minutes.

## 2024-08-28 NOTE — CARE PLAN
The patient is Stable - Low risk of patient condition declining or worsening    Shift Goals  Clinical Goals: [place] to unit, VSS, hemodynamic stability, Cardiac monitor, NPO @ 0000 for pacer repair AM.  Patient Goals: rest  Family Goals: joseph    Progress made toward(s) clinical / shift goals:    Problem: Seizure Precautions  Goal: Implementation of seizure precautions  Outcome: Progressing   Bed rails padded, suction set up and @ bedside    Problem: Knowledge Deficit - Standard  Goal: Patient and family/care givers will demonstrate understanding of plan of care, disease process/condition, diagnostic tests and medications  Outcome: Progressing   Discussed plan of care, pt able to actively participate in the learning process and demonstrates understanding by return demonstration or return explanation. Improved ability to communicate regarding their healthcare and current admission. Improved ability to identify barriers to learning and care.      Patient is not progressing towards the following goals:

## 2024-08-28 NOTE — PROGRESS NOTES
Pt returned from cath lab VSS. Pt deies pain at this time. Pt on RA. Tele leads placed. Monitor room called. Postop VS initiated.   1145 Pacer interrogation at bedside.